# Patient Record
Sex: FEMALE | Race: WHITE | Employment: FULL TIME | URBAN - METROPOLITAN AREA
[De-identification: names, ages, dates, MRNs, and addresses within clinical notes are randomized per-mention and may not be internally consistent; named-entity substitution may affect disease eponyms.]

---

## 2022-04-05 ENCOUNTER — OFFICE VISIT (OUTPATIENT)
Dept: OBGYN CLINIC | Age: 47
End: 2022-04-05
Payer: COMMERCIAL

## 2022-04-05 VITALS — WEIGHT: 293 LBS

## 2022-04-05 DIAGNOSIS — Z11.51 SCREENING FOR HPV (HUMAN PAPILLOMAVIRUS): ICD-10-CM

## 2022-04-05 DIAGNOSIS — Z01.419 WOMEN'S ANNUAL ROUTINE GYNECOLOGICAL EXAMINATION: ICD-10-CM

## 2022-04-05 DIAGNOSIS — Z12.31 ENCOUNTER FOR SCREENING MAMMOGRAM FOR BREAST CANCER: Primary | ICD-10-CM

## 2022-04-05 PROCEDURE — 99386 PREV VISIT NEW AGE 40-64: CPT | Performed by: OBSTETRICS & GYNECOLOGY

## 2022-04-05 RX ORDER — ZINC GLUCONATE 50 MG
50 TABLET ORAL DAILY
COMMUNITY

## 2022-04-05 RX ORDER — B-COMPLEX WITH VITAMIN C
1 TABLET ORAL 2 TIMES DAILY WITH MEALS
COMMUNITY
End: 2022-10-24

## 2022-04-05 RX ORDER — ATORVASTATIN CALCIUM 10 MG/1
10 TABLET, FILM COATED ORAL DAILY
COMMUNITY

## 2022-04-05 RX ORDER — DOXYCYCLINE HYCLATE 100 MG
TABLET ORAL
COMMUNITY
Start: 2022-03-31 | End: 2022-04-28

## 2022-04-05 RX ORDER — PREDNISONE 10 MG/1
TABLET ORAL
COMMUNITY
Start: 2022-03-31 | End: 2022-08-25

## 2022-04-05 RX ORDER — LISINOPRIL 10 MG/1
10 TABLET ORAL DAILY
COMMUNITY

## 2022-04-05 RX ORDER — METHYLPREDNISOLONE 4 MG/1
TABLET ORAL
COMMUNITY
Start: 2022-01-13 | End: 2022-10-24 | Stop reason: ALTCHOICE

## 2022-04-05 RX ORDER — LANSOPRAZOLE 15 MG/1
15 CAPSULE, DELAYED RELEASE ORAL DAILY
COMMUNITY

## 2022-04-05 RX ORDER — GLIPIZIDE 10 MG/1
10 TABLET ORAL
COMMUNITY

## 2022-04-05 RX ORDER — CEFDINIR 300 MG/1
CAPSULE ORAL
COMMUNITY
Start: 2022-01-13 | End: 2022-10-24 | Stop reason: ALTCHOICE

## 2022-04-05 RX ORDER — MEDROXYPROGESTERONE ACETATE 10 MG/1
10 TABLET ORAL DAILY
Qty: 30 TABLET | Refills: 3 | Status: SHIPPED | OUTPATIENT
Start: 2022-04-05 | End: 2022-07-27 | Stop reason: SDUPTHER

## 2022-04-05 NOTE — PROGRESS NOTES
HPI:  Lesley Mckinley (: 1975) is a 42 y.o. female, Established patient, here for evaluation of the following chief complaint(s): Annual Exam (heavy and painful periods and irregular. See Dr. Rafael Mckeon who wont do LAVH. Bruderer drug for progesterone)  Patient is here for second opinion. She is a 53-year-old obese female with dysfunctional uterine bleeding. She states that her previous gynecologist is reluctant to do anything surgically and also would not give her any hormone treatment because she has had her tubes tied. And has irregular and heavy bleeding she had 2 previous  sections. She had a recent ultrasound which showed a normal intrauterine cavity and normal uterine      SUBJECTIVE/OBJECTIVE:    Past Surgical History:   Procedure Laterality Date     SECTION      DILATION AND CURETTAGE      ENDOMETRIAL BIOPSY          Review of Systems   Genitourinary: Positive for menstrual problem. Physical Exam  Vitals and nursing note reviewed. Exam conducted with a chaperone present. Constitutional:       Appearance: She is well-developed. HENT:      Head: Normocephalic and atraumatic. Eyes:      Pupils: Pupils are equal, round, and reactive to light. Neck:      Thyroid: No thyromegaly. Trachea: No tracheal deviation. Cardiovascular:      Rate and Rhythm: Normal rate and regular rhythm. Heart sounds: Normal heart sounds. Pulmonary:      Effort: Pulmonary effort is normal.      Breath sounds: Normal breath sounds. Chest:      Chest wall: No tenderness. Abdominal:      General: Bowel sounds are normal. There is no distension. Palpations: Abdomen is soft. There is no mass. Tenderness: There is no abdominal tenderness. There is no guarding or rebound. Hernia: There is no hernia in the left inguinal area. Genitourinary:     Labia:         Right: No rash, tenderness, lesion or injury. Left: No rash, tenderness, lesion or injury.        Vagina: Normal. No foreign body. No vaginal discharge, erythema, tenderness or bleeding. Cervix: No cervical motion tenderness or discharge. Uterus: Not deviated, not enlarged, not fixed and not tender. Adnexa:         Right: No mass, tenderness or fullness. Left: No mass, tenderness or fullness. Rectum: Normal. No mass, tenderness, anal fissure, external hemorrhoid or internal hemorrhoid. Normal anal tone. Musculoskeletal:         General: Normal range of motion. Cervical back: Normal range of motion. Lymphadenopathy:      Cervical: No cervical adenopathy. Neurological:      Mental Status: She is alert and oriented to person, place, and time. Vitals:    04/05/22 1455   Weight: (!) 336 lb (152.4 kg)         ASSESSMENT/PLAN:     Diagnosis Orders   1. Encounter for screening mammogram for breast cancer  JAZMINE DIGITAL SCREEN W OR WO CAD BILATERAL   2. Screening for HPV (human papillomavirus)  PAP SMEAR   3. Women's annual routine gynecological examination  PAP SMEAR    JAZMINE DIGITAL SCREEN W OR WO CAD BILATERAL   Functional uterine bleeding    PLAN: Recommend Provera 10 mg a day for 10 days then restart after the onset of her withdrawal bleed and continue that indefinitely. We discussed the use of an IUD to stop the bleeding versus a endometrial ablation patient will discuss that with her  and they will decide what they wanted. No follow-ups on file. On this date 4/5/2022 I have spent 40 minutes reviewing previous notes, test results and face to face with the patient discussing the diagnosis and importance of compliance with the treatment plan as well as documenting on the day of the visit. An electronic signature was used to authenticate this note.  Please note this report has been partially produced using speech recognition software and may cause contain errors related to that system including grammar, punctuation and spelling as well as words and phrases that may seem inappropriate. If there are questions or concerns please feel free to contact me to clarify.

## 2022-04-13 LAB
HPV COMMENT: NORMAL
HPV TYPE 16: NOT DETECTED
HPV TYPE 18: NOT DETECTED
HPVOH (OTHER TYPES): NOT DETECTED

## 2022-05-31 ENCOUNTER — TELEPHONE (OUTPATIENT)
Dept: OBGYN CLINIC | Age: 47
End: 2022-05-31

## 2022-05-31 NOTE — TELEPHONE ENCOUNTER
Lm for pt to call back regarding IUD form. Pt insurance does not have Erroll Mace as a provider listed for care so they can not submit claim for IUD. Pt will need to call insurance to see who is covered and we can resubmit for with other provider.

## 2022-06-02 NOTE — TELEPHONE ENCOUNTER
Patient states that she called her insurance and they told her that the Rx would need to be sent through her [facility] plan and the preferred through her insurance is peragard but the patient states that she needs the one with hormone and that would be the Mirena and that might need a PA done. So she is requesting that be done for the Mirena .

## 2022-06-22 ENCOUNTER — TELEPHONE (OUTPATIENT)
Dept: OBGYN CLINIC | Age: 47
End: 2022-06-22

## 2022-06-22 NOTE — TELEPHONE ENCOUNTER
Pt calling in for follow up on IUD. Please call the pt to update her and let her what pharmacy it was sent to .

## 2022-06-27 ENCOUNTER — TELEPHONE (OUTPATIENT)
Dept: OBGYN CLINIC | Age: 47
End: 2022-06-27

## 2022-07-27 RX ORDER — MEDROXYPROGESTERONE ACETATE 10 MG/1
10 TABLET ORAL DAILY
Qty: 30 TABLET | Refills: 3 | Status: SHIPPED | OUTPATIENT
Start: 2022-07-27 | End: 2022-08-25 | Stop reason: ALTCHOICE

## 2022-08-25 ENCOUNTER — PROCEDURE VISIT (OUTPATIENT)
Dept: OBGYN CLINIC | Age: 47
End: 2022-08-25
Payer: COMMERCIAL

## 2022-08-25 VITALS
DIASTOLIC BLOOD PRESSURE: 84 MMHG | HEART RATE: 76 BPM | HEIGHT: 65 IN | WEIGHT: 293 LBS | SYSTOLIC BLOOD PRESSURE: 136 MMHG | BODY MASS INDEX: 48.82 KG/M2

## 2022-08-25 DIAGNOSIS — Z30.430 ENCOUNTER FOR INSERTION OF MIRENA IUD: ICD-10-CM

## 2022-08-25 DIAGNOSIS — Z32.02 URINE PREGNANCY TEST NEGATIVE: ICD-10-CM

## 2022-08-25 DIAGNOSIS — N93.8 DUB (DYSFUNCTIONAL UTERINE BLEEDING): Primary | ICD-10-CM

## 2022-08-25 PROCEDURE — 58300 INSERT INTRAUTERINE DEVICE: CPT | Performed by: ADVANCED PRACTICE MIDWIFE

## 2022-08-25 PROCEDURE — 81025 URINE PREGNANCY TEST: CPT | Performed by: ADVANCED PRACTICE MIDWIFE

## 2022-08-25 NOTE — PROGRESS NOTES
IUD Insertion Procedure Note    Pre-operative Diagnosis:  Dysfunctional Uterine Bleeding    Post-operative Diagnosis:  Same    Indications:  Same    Procedure Details:   Urine pregnancy test was done and result was negative. The risks (including infection, bleeding, pain, and uterine perforation) and benefits of the procedure were explained to the patient and Written informed consent was obtained. A Time Out was performed with patient participation prior to beginning the procedure. Cervix cleansed with Betadine. Uterus sounded to 8 cm. Mirena IUD inserted without difficulty. Strings visible and trimmed. Patient tolerated procedure well. IUD Information:    Type:  Mirena  LOT Number:  [id number]  Expiration Date:  10/31/2024  NDC:  55709-172-08    Condition:  Stable    Complications:  None    Plan:  The patient was advised to call for any fever or for prolonged or severe pain or bleeding. She was advised to use OTC Ibuprofen or Tylenol as needed for mild to moderate pain. Follow up:  Return in about 4 weeks (around 9/22/2022) for Follow-up on new medication, (virtual is fine).     Particia GUILLERMO Lou CNM

## 2022-09-22 ENCOUNTER — TELEPHONE (OUTPATIENT)
Dept: OBGYN CLINIC | Age: 47
End: 2022-09-22

## 2022-09-22 ENCOUNTER — TELEMEDICINE (OUTPATIENT)
Dept: OBGYN CLINIC | Age: 47
End: 2022-09-22
Payer: COMMERCIAL

## 2022-09-22 DIAGNOSIS — Z30.431 INTRAUTERINE DEVICE SURVEILLANCE: ICD-10-CM

## 2022-09-22 DIAGNOSIS — Z97.5 BREAKTHROUGH BLEEDING WITH IUD: ICD-10-CM

## 2022-09-22 DIAGNOSIS — N92.1 BREAKTHROUGH BLEEDING WITH IUD: Primary | ICD-10-CM

## 2022-09-22 DIAGNOSIS — N94.6 DYSMENORRHEA: Primary | ICD-10-CM

## 2022-09-22 DIAGNOSIS — Z97.5 BREAKTHROUGH BLEEDING WITH IUD: Primary | ICD-10-CM

## 2022-09-22 DIAGNOSIS — N92.1 BREAKTHROUGH BLEEDING WITH IUD: ICD-10-CM

## 2022-09-22 PROCEDURE — 99214 OFFICE O/P EST MOD 30 MIN: CPT | Performed by: ADVANCED PRACTICE MIDWIFE

## 2022-09-22 RX ORDER — NORETHINDRONE AND ETHINYL ESTRADIOL 1 MG-35MCG
KIT ORAL
Qty: 3 PACKET | Refills: 3 | Status: SHIPPED
Start: 2022-09-22 | End: 2022-10-24 | Stop reason: ALTCHOICE

## 2022-09-22 ASSESSMENT — PATIENT HEALTH QUESTIONNAIRE - PHQ9
1. LITTLE INTEREST OR PLEASURE IN DOING THINGS: 0
SUM OF ALL RESPONSES TO PHQ9 QUESTIONS 1 & 2: 0
SUM OF ALL RESPONSES TO PHQ QUESTIONS 1-9: 0
SUM OF ALL RESPONSES TO PHQ QUESTIONS 1-9: 0
2. FEELING DOWN, DEPRESSED OR HOPELESS: 0
SUM OF ALL RESPONSES TO PHQ QUESTIONS 1-9: 0
SUM OF ALL RESPONSES TO PHQ QUESTIONS 1-9: 0

## 2022-09-22 ASSESSMENT — ENCOUNTER SYMPTOMS
SHORTNESS OF BREATH: 0
COUGH: 0
ABDOMINAL PAIN: 0
NAUSEA: 0
DIARRHEA: 0
VOMITING: 0
CONSTIPATION: 0

## 2022-09-22 NOTE — PROGRESS NOTES
Charmayne Shall (:  1975) is a 42 y.o. female Established patient, here for evaluation of the following chief complaint(s):   Chief Complaint   Patient presents with    Follow-up     IUD follow up. She states she's still cramping and bleeding with clots. SUBJECTIVE/OBJECTIVE:  Dysmenorrhea   Utilizing hormonal contraception to relieve uncomfortable menstrual symptoms. Mirena IUD placed 22. About 1 week following insertion she began having moderate bleeding with clots that has continued through today. The bleeding has not shown any signs of decreasing or resolving. Overall she is happy with Mirena and wishes to continue, but only if the bleeding resolves. Discussed treatment options:  allow time, scheduled Ibuprofen, short-term OCP's. She just had her tonsils removed and has not been cleared yet for NSAID use. Her follow-up appointment is today. If she is not cleared for NSAID use, she will opt for short-term OCP's. Review of Systems   Respiratory:  Negative for cough and shortness of breath. Gastrointestinal:  Negative for abdominal pain, constipation, diarrhea, nausea and vomiting. Genitourinary:  Negative for difficulty urinating, dysuria, menstrual problem, pelvic pain, vaginal bleeding and vaginal discharge. All other systems reviewed and are negative. No flowsheet data found. Physical Exam  Constitutional:       General: She is not in acute distress. Appearance: Normal appearance. She is not ill-appearing. Pulmonary:      Effort: Pulmonary effort is normal.   Neurological:      Mental Status: She is alert and oriented to person, place, and time. Psychiatric:         Mood and Affect: Mood normal.         Behavior: Behavior normal.         Thought Content: Thought content normal.         Judgment: Judgment normal.     ASSESSMENT/PLAN:   Diagnosis Orders   1. Dysmenorrhea        2. Intrauterine device surveillance        3.  Breakthrough bleeding with IUD No orders of the defined types were placed in this encounter. No orders of the defined types were placed in this encounter. Dysmenorrhea, IUD Surveillance  Mirena IUD placed 8/25/22 to relieve menstrual symptoms. Breakthrough Bleeding with IUD  Scheduled Ibuprofen 800mg TID x7 days if cleared for NSAID use by ENT. Short course OCP's if not cleared for NSAID use. Follow-up appointment with ENT is today, she will call to let me know which option to move forward with. Return if symptoms worsen or fail to improve. Bala Ignacio, was evaluated through a synchronous (real-time) audio-video encounter. The patient (or guardian if applicable) is aware that this is a billable service, which includes applicable co-pays. This Virtual Visit was conducted with patient's (and/or legal guardian's) consent. The visit was conducted pursuant to the emergency declaration under the [address] [place] [address], [address] [place] [address] authority and the [employer] Act. Patient identification was verified, and a caregiver was present when appropriate. The patient was located at home in a state where the provider was licensed to provide care. Total time spent for this encounter: Not billed by time    --GUILLERMO Dempsey CNM on 9/22/2022 at 8:43 AM    An electronic signature was used to authenticate this note.

## 2022-09-22 NOTE — TELEPHONE ENCOUNTER
Pt calling in, She spoke with her other provider , she is unable to use Motrin.      She will need to use [facility] as discussed

## 2022-10-13 ENCOUNTER — TELEMEDICINE (OUTPATIENT)
Dept: OBGYN CLINIC | Age: 47
End: 2022-10-13
Payer: COMMERCIAL

## 2022-10-13 DIAGNOSIS — Z97.5 BREAKTHROUGH BLEEDING WITH IUD: Primary | ICD-10-CM

## 2022-10-13 DIAGNOSIS — N92.1 BREAKTHROUGH BLEEDING WITH IUD: Primary | ICD-10-CM

## 2022-10-13 DIAGNOSIS — R10.2 PELVIC PAIN: ICD-10-CM

## 2022-10-13 PROCEDURE — 99213 OFFICE O/P EST LOW 20 MIN: CPT | Performed by: ADVANCED PRACTICE MIDWIFE

## 2022-10-13 ASSESSMENT — ENCOUNTER SYMPTOMS
CONSTIPATION: 0
SHORTNESS OF BREATH: 0
DIARRHEA: 0
ABDOMINAL PAIN: 0
NAUSEA: 0
COUGH: 0
VOMITING: 0

## 2022-10-13 NOTE — PROGRESS NOTES
Clare Jorgensen (:  1975) is a 42 y.o. female Established patient, here for evaluation of the following chief complaint(s):   Chief Complaint   Patient presents with    Pelvic Pain    Medication Problem     Continued bleeding with IUD        SUBJECTIVE/OBJECTIVE:  Dysmenorrhea, Breakthrough Bleeding with IUD   Utilizing hormonal contraception to relieve uncomfortable menstrual symptoms. Mirena IUD placed 22. She was initially experiencing heavy to moderate bleeding with clots that continued for greater than 4 weeks following insertion. She was unable to take Ibuprofen due to recent tonsillectomy surgery and instead started a course of OCP's to control/stop bleeding. Bleeding has improved, no primarily brown discharge that is occasionally bright red. She has finished packet #1 and just started packet #2. Left Pelvic Pain  New onset of sharp left lower pelvic pain that is severe when it occurs. Symptoms began within the past few days. Review of Systems   Respiratory:  Negative for cough and shortness of breath. Gastrointestinal:  Negative for abdominal pain, constipation, diarrhea, nausea and vomiting. Genitourinary:  Positive for menstrual problem. Negative for difficulty urinating, dysuria, pelvic pain, vaginal bleeding and vaginal discharge. All other systems reviewed and are negative. No flowsheet data found. Physical Exam  Constitutional:       General: She is not in acute distress. Appearance: Normal appearance. She is not ill-appearing. Pulmonary:      Effort: Pulmonary effort is normal.   Neurological:      Mental Status: She is alert and oriented to person, place, and time. Psychiatric:         Mood and Affect: Mood normal.         Behavior: Behavior normal.         Thought Content: Thought content normal.         Judgment: Judgment normal.     ASSESSMENT/PLAN:   Diagnosis Orders   1.  Breakthrough bleeding with IUD  [facility] OB TRANSVAGINAL 2. Pelvic pain  US PELVIS COMPLETE    US NON OB TRANSVAGINAL          Orders Placed This Encounter   Procedures    US PELVIS COMPLETE     This procedure can be scheduled via [employer]. Access your [employer] account by visiting [email or web address]. Standing Status:   Future     Standing Expiration Date:   10/13/2023     Order Specific Question:   Reason for exam:     Answer:   new onset of left pelvic pain    US NON OB TRANSVAGINAL     This procedure can be scheduled via [employer]. Access your [employer] account by visiting [email or web address]. Standing Status:   Future     Standing Expiration Date:   10/13/2023     No orders of the defined types were placed in this encounter. Breakthrough Bleeding with IUD  Discontinue OCP's x4-5 days, then restart. Pelvic Pain  Obtain pelvic US    Return for Discuss pelvic US results. Tony Wahl, was evaluated through a synchronous (real-time) audio-video encounter. The patient (or guardian if applicable) is aware that this is a billable service, which includes applicable co-pays. This Virtual Visit was conducted with patient's (and/or legal guardian's) consent. The visit was conducted pursuant to the emergency declaration under the [address] [place] [address] authority and the [employer] and [employer] General Act. Patient identification was verified, and a caregiver was present when appropriate. The patient was located at home in a state where the provider was licensed to provide care. Total time spent for this encounter: Not billed by time    --GUILLERMO Wagner CNM on 10/13/2022 at 10:46 AM    An electronic signature was used to authenticate this note.

## 2022-10-18 ENCOUNTER — HOSPITAL ENCOUNTER (OUTPATIENT)
Dept: ULTRASOUND IMAGING | Age: 47
Discharge: HOME | End: 2022-10-20
Payer: COMMERCIAL

## 2022-10-18 DIAGNOSIS — R10.2 PELVIC PAIN: ICD-10-CM

## 2022-10-18 DIAGNOSIS — N92.1 BREAKTHROUGH BLEEDING WITH IUD: ICD-10-CM

## 2022-10-18 DIAGNOSIS — Z97.5 BREAKTHROUGH BLEEDING WITH IUD: ICD-10-CM

## 2022-10-18 PROCEDURE — 76856 US EXAM PELVIC COMPLETE: CPT

## 2022-10-18 PROCEDURE — 76830 TRANSVAGINAL US NON-OB: CPT

## 2022-10-20 ENCOUNTER — TELEPHONE (OUTPATIENT)
Dept: OBGYN CLINIC | Age: 47
End: 2022-10-20

## 2022-10-24 ENCOUNTER — PROCEDURE VISIT (OUTPATIENT)
Dept: OBGYN CLINIC | Age: 47
End: 2022-10-24
Payer: COMMERCIAL

## 2022-10-24 VITALS
WEIGHT: 293 LBS | BODY MASS INDEX: 53.08 KG/M2 | SYSTOLIC BLOOD PRESSURE: 168 MMHG | HEART RATE: 90 BPM | DIASTOLIC BLOOD PRESSURE: 92 MMHG

## 2022-10-24 DIAGNOSIS — Z97.5 BREAKTHROUGH BLEEDING WITH IUD: ICD-10-CM

## 2022-10-24 DIAGNOSIS — N92.1 BREAKTHROUGH BLEEDING WITH IUD: ICD-10-CM

## 2022-10-24 DIAGNOSIS — Z30.432 ENCOUNTER FOR IUD REMOVAL: ICD-10-CM

## 2022-10-24 DIAGNOSIS — Z30.011 ORAL CONTRACEPTION INITIAL PRESCRIPTION: ICD-10-CM

## 2022-10-24 DIAGNOSIS — N94.6 DYSMENORRHEA: Primary | ICD-10-CM

## 2022-10-24 PROCEDURE — 58301 REMOVE INTRAUTERINE DEVICE: CPT | Performed by: ADVANCED PRACTICE MIDWIFE

## 2022-10-24 PROCEDURE — 99214 OFFICE O/P EST MOD 30 MIN: CPT | Performed by: ADVANCED PRACTICE MIDWIFE

## 2022-10-24 RX ORDER — LEVONORGESTREL AND ETHINYL ESTRADIOL 0.1-0.02MG
1 KIT ORAL DAILY
Qty: 90 TABLET | Refills: 3 | Status: SHIPPED | OUTPATIENT
Start: 2022-10-24 | End: 2023-10-24

## 2022-10-24 RX ORDER — LEVONORGESTREL AND ETHINYL ESTRADIOL 0.1-0.02MG
1 KIT ORAL DAILY
Qty: 1 PACKET | Refills: 3 | Status: CANCELLED | OUTPATIENT
Start: 2022-10-24

## 2022-10-24 ASSESSMENT — ENCOUNTER SYMPTOMS
VOMITING: 0
SHORTNESS OF BREATH: 0
ABDOMINAL PAIN: 0
CONSTIPATION: 0
COUGH: 0
NAUSEA: 0
DIARRHEA: 0

## 2022-10-24 NOTE — PROGRESS NOTES
SUBJECTIVE:  Mayco Martinez is a 42 y.o. female who presents here today for complaints of:      Chief Complaint   Patient presents with    Procedure     IUD removal d/t bleeding for 9 weeks     Breakthrough Bleeding with IUD  Continues to have moderate to heavy breakthrough bleeding with her IUD that has not decreased or shown any signs of resolving. Breakthrough bleeding did not improve with a cycle of Ibuprofen or with the addition of combined OCP's. She is requesting removal and to transition back to combined OCP's. Discussed the option of consultation for hysterectomy as a better long-term solution to the dysmenorrhea she has been struggling with for years. Review of Systems   Respiratory:  Negative for cough and shortness of breath. Gastrointestinal:  Negative for abdominal pain, constipation, diarrhea, nausea and vomiting. Genitourinary:  Positive for menstrual problem. Negative for difficulty urinating, dysuria, pelvic pain, vaginal bleeding and vaginal discharge. All other systems reviewed and are negative. OBJECTIVE:  Vitals:  BP (!) 168/92   Pulse 90   Wt (!) 319 lb (144.7 kg)   BMI 53.08 kg/m²     Physical Exam  Vitals and nursing note reviewed. Constitutional:       General: She is not in acute distress. Appearance: Normal appearance. She is not ill-appearing, toxic-appearing or diaphoretic. HENT:      Head: Normocephalic. Nose: No congestion or rhinorrhea. Mouth/Throat:      Mouth: Mucous membranes are moist.   Eyes:      General: No scleral icterus. Right eye: No discharge. Left eye: No discharge. Cardiovascular:      Rate and Rhythm: Normal rate and regular rhythm. Pulses: Normal pulses. Pulmonary:      Effort: Pulmonary effort is normal. No respiratory distress. Abdominal:      Palpations: Abdomen is soft. Hernia: There is no hernia in the left inguinal area or right inguinal area. Genitourinary:     General: Normal vulva.       Exam position: Lithotomy position. Pubic Area: No rash or pubic lice. Labia:         Right: No rash, tenderness, lesion or injury. Left: No rash, tenderness, lesion or injury. Urethra: No prolapse, urethral pain, urethral swelling or urethral lesion. Vagina: No signs of injury and foreign body. No vaginal discharge, erythema, tenderness, bleeding, lesions or prolapsed vaginal walls. Cervix: No cervical motion tenderness, discharge, friability, lesion, erythema, cervical bleeding or eversion. Uterus: Not deviated, not enlarged, not fixed, not tender and no uterine prolapse. Adnexa:         Right: No mass, tenderness or fullness. Left: No mass, tenderness or fullness. Rectum: No mass or external hemorrhoid. Musculoskeletal:         General: Normal range of motion. Cervical back: Normal range of motion and neck supple. Right lower leg: No edema. Left lower leg: No edema. Lymphadenopathy:      Lower Body: No right inguinal adenopathy. No left inguinal adenopathy. Skin:     General: Skin is warm and dry. Capillary Refill: Capillary refill takes less than 2 seconds. Coloration: Skin is not jaundiced or pale. Neurological:      Mental Status: She is alert and oriented to person, place, and time. Mental status is at baseline. Motor: No weakness. Coordination: Coordination normal.      Gait: Gait normal.   Psychiatric:         Mood and Affect: Mood normal.         Behavior: Behavior normal.     ASSESSMENT & PLAN:   Diagnosis Orders   1. Dysmenorrhea        2. Breakthrough bleeding with IUD        3. Encounter for IUD removal        4. Oral contraception initial prescription            Dysmenorrhea  Utilizing hormonal contraception to relieve uncomfortable menstrual symptoms.     Breakthrough Bleeding with IUD, Requesting Removal  See procedure note    Combined OCP's, Initial Prescription  Quick start Aviane (0.1-20)  Hysterectomy consultation with Dr. Basim Escobar    Return for Hysterectomy consultation with Dr. Basim Escobar.     Michelle Chiu, GUILLERMO - FAITH

## 2022-10-24 NOTE — PROGRESS NOTES
IUD Removal Procedure Note    Pre-operative Diagnosis:  Breakthrough Bleeding with IUD    Post-operative Diagnosis:  Same    Indications:  Same    Procedure Details: The risks (including infection, bleeding, pain) and benefits of the procedure were explained to the patient and Written informed consent was obtained. A Time Out was performed with patient participation prior to beginning the procedure. The cervix was visualized, IUD strings present. The existing IUD strings were grasped with ring forceps, the IUD was removed intact, and shown to the patient for confirmation that it had been removed and was intact. The procedure was tolerated well. Condition:  Stable    Complications:  None    Plan:  The patient was advised to call for any fever or for prolonged or severe pain or bleeding. She was advised to use OTC Ibuprofen or Tylenol as needed for mild to moderate pain. Follow up:  Return for Hysterectomy consultation with Dr. Jorge Mccracken.     GUILLERMO Panchal CNM

## 2022-10-26 ENCOUNTER — TELEPHONE (OUTPATIENT)
Dept: OBGYN CLINIC | Age: 47
End: 2022-10-26

## 2022-10-26 ENCOUNTER — PATIENT MESSAGE (OUTPATIENT)
Dept: OBGYN CLINIC | Age: 47
End: 2022-10-26

## 2022-10-26 DIAGNOSIS — N92.0 MENORRHAGIA DUE TO INTRAUTERINE DEVICE (IUD) (HCC): Primary | ICD-10-CM

## 2022-10-26 DIAGNOSIS — T83.89XA MENORRHAGIA DUE TO INTRAUTERINE DEVICE (IUD) (HCC): Primary | ICD-10-CM

## 2022-10-26 DIAGNOSIS — N94.6 DYSMENORRHEA: Primary | ICD-10-CM

## 2022-10-26 DIAGNOSIS — R11.2 DRUG-INDUCED NAUSEA AND VOMITING: ICD-10-CM

## 2022-10-26 DIAGNOSIS — T50.905A DRUG-INDUCED NAUSEA AND VOMITING: ICD-10-CM

## 2022-10-26 RX ORDER — IBUPROFEN 800 MG/1
800 TABLET ORAL EVERY 8 HOURS SCHEDULED
Qty: 15 TABLET | Refills: 1 | Status: SHIPPED | OUTPATIENT
Start: 2022-10-26 | End: 2022-11-04

## 2022-10-26 NOTE — TELEPHONE ENCOUNTER
Continues to have heavy bleeding following IUD removal.  Start scheduled Ibuprofen, follow-up if bleeding does not improve.

## 2022-10-28 RX ORDER — ONDANSETRON 4 MG/1
TABLET, ORALLY DISINTEGRATING ORAL
Qty: 15 TABLET | Refills: 0 | Status: SHIPPED | OUTPATIENT
Start: 2022-10-28 | End: 2022-11-27

## 2022-10-28 RX ORDER — ESTRADIOL 2 MG/1
TABLET ORAL
Qty: 16 TABLET | Refills: 0 | Status: SHIPPED | OUTPATIENT
Start: 2022-10-28 | End: 2022-11-17 | Stop reason: ALTCHOICE

## 2022-10-28 NOTE — TELEPHONE ENCOUNTER
From: Seng Nassar  To: Bob Osei APRN - CNM  Sent: 10/26/2022 6:12 AM EDT  Subject: Bleeding    I was just in on Monday for my IUD removal for bleeding for almost 9 weeks. Last week before removal I had a regular period. After removal on Monday, I have been bleeding moderate to heavy, bright red. However, Im having some clotting and when I wipe its thick and chunky like. Is that normal? If so how long would that be considered normal to happen? Thank you!

## 2022-11-04 DIAGNOSIS — N94.6 DYSMENORRHEA: ICD-10-CM

## 2022-11-04 RX ORDER — IBUPROFEN 800 MG/1
TABLET ORAL
Qty: 15 TABLET | Refills: 1 | Status: SHIPPED | OUTPATIENT
Start: 2022-11-04

## 2022-11-17 ENCOUNTER — OFFICE VISIT (OUTPATIENT)
Dept: OBGYN CLINIC | Age: 47
End: 2022-11-17
Payer: COMMERCIAL

## 2022-11-17 VITALS
WEIGHT: 293 LBS | SYSTOLIC BLOOD PRESSURE: 124 MMHG | HEART RATE: 93 BPM | BODY MASS INDEX: 53.25 KG/M2 | DIASTOLIC BLOOD PRESSURE: 88 MMHG

## 2022-11-17 DIAGNOSIS — N92.0 MENORRHAGIA WITH REGULAR CYCLE: Primary | ICD-10-CM

## 2022-11-17 PROBLEM — H93.19 TINNITUS: Status: ACTIVE | Noted: 2022-03-31

## 2022-11-17 PROBLEM — Z90.89 S/P TONSILLECTOMY: Status: ACTIVE | Noted: 2022-09-22

## 2022-11-17 PROBLEM — H69.80 EUSTACHIAN TUBE DYSFUNCTION: Status: ACTIVE | Noted: 2022-03-31

## 2022-11-17 PROCEDURE — 99213 OFFICE O/P EST LOW 20 MIN: CPT | Performed by: OBSTETRICS & GYNECOLOGY

## 2022-11-17 ASSESSMENT — ENCOUNTER SYMPTOMS
CHEST TIGHTNESS: 0
NAUSEA: 0
WHEEZING: 0
BLOOD IN STOOL: 0
ABDOMINAL PAIN: 0
TROUBLE SWALLOWING: 0
VOMITING: 0
CONSTIPATION: 0
SORE THROAT: 0
COLOR CHANGE: 0
COUGH: 0
BACK PAIN: 0
VOICE CHANGE: 0
ABDOMINAL DISTENTION: 0
SHORTNESS OF BREATH: 0

## 2022-11-17 NOTE — PROGRESS NOTES
HPI:  Cesia Bey (: 1975) is a 42 y.o. female, Established patient, here for evaluation of the following chief complaint(s):  Surgical Consult (Hysterectomy. Was told by another surgeon that she was too heavy for surgery. Has had IUD and birth control. Heavy bleeding and clots. US done.)  Patient is here to discuss surgical options for menorrhagia. She has failed hormonal methods of treatment as well as an IUD. Needs 5 to 7 days and changes a pad every hour. ultraSound showed a normal size uterus. She has had 2  sections    SUBJECTIVE/OBJECTIVE:    Past Surgical History:   Procedure Laterality Date     SECTION      DILATION AND CURETTAGE      ENDOMETRIAL BIOPSY          Review of Systems   Constitutional:  Negative for activity change, appetite change, fatigue and unexpected weight change. HENT:  Negative for dental problem, ear pain, hearing loss, nosebleeds, sore throat, trouble swallowing and voice change. Eyes:  Negative for visual disturbance. Respiratory:  Negative for cough, chest tightness, shortness of breath and wheezing. Cardiovascular:  Negative for chest pain and palpitations. Gastrointestinal:  Negative for abdominal distention, abdominal pain, blood in stool, constipation, nausea and vomiting. Endocrine: Negative for cold intolerance, heat intolerance, polydipsia, polyphagia and polyuria. Genitourinary:  Positive for menstrual problem. Negative for difficulty urinating, dyspareunia, dysuria, flank pain, frequency, genital sores, hematuria, pelvic pain, urgency, vaginal bleeding, vaginal discharge and vaginal pain. Musculoskeletal:  Negative for arthralgias, back pain, joint swelling and myalgias. Skin:  Negative for color change and rash. Allergic/Immunologic: Negative for environmental allergies, food allergies and immunocompromised state.    Neurological:  Negative for dizziness, seizures, syncope, speech difficulty, weakness, numbness and headaches. Hematological:  Negative for adenopathy. Does not bruise/bleed easily. Psychiatric/Behavioral:  Negative for agitation, behavioral problems, confusion, decreased concentration, dysphoric mood and suicidal ideas. The patient is not nervous/anxious and is not hyperactive. Physical Exam  Vitals and nursing note reviewed. Constitutional:       Appearance: Normal appearance. Neurological:      Mental Status: She is alert. Vitals:    22 1440   BP: 124/88   Pulse: 93   Weight: (!) 320 lb (145.2 kg)         ASSESSMENT/PLAN:    menorrhagia  Obesity  Previous  section x2    PLAN: Discussed endometrial ablation versus a LAVH. Benefits alternatives of both procedures were explained to the patient in detail. Continue think about what she wants to do and let me know but she is leaning toward the ablation. She has had a tubal ligation in the past      No follow-ups on file. On this date 2022 I have spent 20 minutes reviewing previous notes, test results and face to face with the patient discussing the diagnosis and importance of compliance with the treatment plan as well as documenting on the day of the visit. An electronic signature was used to authenticate this note. Please note this report has been partially produced using speech recognition software and may cause contain errors related to that system including grammar, punctuation and spelling as well as words and phrases that may seem inappropriate. If there are questions or concerns please feel free to contact me to clarify.

## 2022-11-23 ENCOUNTER — TELEPHONE (OUTPATIENT)
Dept: OBGYN CLINIC | Age: 47
End: 2022-11-23

## 2022-11-23 NOTE — TELEPHONE ENCOUNTER
Spoke to the pt, she is still undecided about surgery. She would like for me to give her a call back at the end of December.

## 2023-01-11 DIAGNOSIS — Z12.31 ENCOUNTER FOR SCREENING MAMMOGRAM FOR BREAST CANCER: ICD-10-CM

## 2023-01-11 DIAGNOSIS — Z01.419 WOMEN'S ANNUAL ROUTINE GYNECOLOGICAL EXAMINATION: ICD-10-CM

## 2023-01-16 ENCOUNTER — TELEPHONE (OUTPATIENT)
Dept: OBGYN CLINIC | Age: 48
End: 2023-01-16

## 2023-03-13 ENCOUNTER — TELEPHONE (OUTPATIENT)
Dept: OBGYN CLINIC | Age: 48
End: 2023-03-13

## 2023-03-13 NOTE — TELEPHONE ENCOUNTER
Pt calling in to schedule surgery. Has decided to move forward with hysterectomy.  Please let surgery scheduler know what is being scheduled for pt, complete form

## 2023-03-16 ENCOUNTER — PREP FOR PROCEDURE (OUTPATIENT)
Dept: OBGYN CLINIC | Age: 48
End: 2023-03-16

## 2023-03-16 PROBLEM — R10.2 PELVIC PAIN IN FEMALE: Status: ACTIVE | Noted: 2023-03-16

## 2023-03-16 PROBLEM — N93.9 ABNORMAL UTERINE BLEEDING: Status: ACTIVE | Noted: 2023-03-16

## 2023-03-16 PROBLEM — N92.0 MENORRHAGIA: Status: ACTIVE | Noted: 2023-03-16

## 2023-03-17 RX ORDER — SODIUM CHLORIDE 9 MG/ML
INJECTION, SOLUTION INTRAVENOUS PRN
Status: CANCELLED | OUTPATIENT
Start: 2023-03-17

## 2023-03-17 RX ORDER — SODIUM CHLORIDE 0.9 % (FLUSH) 0.9 %
5-40 SYRINGE (ML) INJECTION EVERY 12 HOURS SCHEDULED
Status: CANCELLED | OUTPATIENT
Start: 2023-03-17

## 2023-03-17 RX ORDER — SODIUM CHLORIDE 0.9 % (FLUSH) 0.9 %
5-40 SYRINGE (ML) INJECTION PRN
Status: CANCELLED | OUTPATIENT
Start: 2023-03-17

## 2023-04-14 ENCOUNTER — ANESTHESIA EVENT (OUTPATIENT)
Dept: OPERATING ROOM | Age: 48
End: 2023-04-14
Payer: COMMERCIAL

## 2023-04-17 ENCOUNTER — ANESTHESIA (OUTPATIENT)
Dept: OPERATING ROOM | Age: 48
End: 2023-04-17
Payer: COMMERCIAL

## 2023-04-17 ENCOUNTER — HOSPITAL ENCOUNTER (OUTPATIENT)
Age: 48
Setting detail: OUTPATIENT SURGERY
Discharge: HOME | End: 2023-04-17
Attending: OBSTETRICS & GYNECOLOGY | Admitting: OBSTETRICS & GYNECOLOGY
Payer: COMMERCIAL

## 2023-04-17 VITALS
TEMPERATURE: 96.9 F | BODY MASS INDEX: 47.09 KG/M2 | HEART RATE: 85 BPM | WEIGHT: 293 LBS | RESPIRATION RATE: 16 BRPM | HEIGHT: 66 IN | DIASTOLIC BLOOD PRESSURE: 81 MMHG | OXYGEN SATURATION: 99 % | SYSTOLIC BLOOD PRESSURE: 169 MMHG

## 2023-04-17 DIAGNOSIS — R10.2 PELVIC PAIN IN FEMALE: ICD-10-CM

## 2023-04-17 DIAGNOSIS — N93.9 ABNORMAL UTERINE BLEEDING: ICD-10-CM

## 2023-04-17 DIAGNOSIS — G89.18 POSTOPERATIVE PAIN: Primary | ICD-10-CM

## 2023-04-17 DIAGNOSIS — N92.0 MENORRHAGIA: ICD-10-CM

## 2023-04-17 LAB
CHP ED QC CHECK: NORMAL
GLUCOSE BLD-MCNC: 277 MG/DL
GLUCOSE BLD-MCNC: 277 MG/DL (ref 70–99)
GLUCOSE BLD-MCNC: 348 MG/DL (ref 70–99)
HCG, URINE, POC: NEGATIVE
Lab: NORMAL
NEGATIVE QC PASS/FAIL: NORMAL
PERFORMED ON: ABNORMAL
PERFORMED ON: ABNORMAL
POSITIVE QC PASS/FAIL: NORMAL

## 2023-04-17 PROCEDURE — 88342 IMHCHEM/IMCYTCHM 1ST ANTB: CPT

## 2023-04-17 PROCEDURE — 7100000011 HC PHASE II RECOVERY - ADDTL 15 MIN: Performed by: OBSTETRICS & GYNECOLOGY

## 2023-04-17 PROCEDURE — 6360000002 HC RX W HCPCS: Performed by: STUDENT IN AN ORGANIZED HEALTH CARE EDUCATION/TRAINING PROGRAM

## 2023-04-17 PROCEDURE — 2500000003 HC RX 250 WO HCPCS: Performed by: STUDENT IN AN ORGANIZED HEALTH CARE EDUCATION/TRAINING PROGRAM

## 2023-04-17 PROCEDURE — 2580000003 HC RX 258: Performed by: OBSTETRICS & GYNECOLOGY

## 2023-04-17 PROCEDURE — 7100000010 HC PHASE II RECOVERY - FIRST 15 MIN: Performed by: OBSTETRICS & GYNECOLOGY

## 2023-04-17 PROCEDURE — 3700000000 HC ANESTHESIA ATTENDED CARE: Performed by: OBSTETRICS & GYNECOLOGY

## 2023-04-17 PROCEDURE — 6370000000 HC RX 637 (ALT 250 FOR IP): Performed by: STUDENT IN AN ORGANIZED HEALTH CARE EDUCATION/TRAINING PROGRAM

## 2023-04-17 PROCEDURE — 2720000010 HC SURG SUPPLY STERILE: Performed by: OBSTETRICS & GYNECOLOGY

## 2023-04-17 PROCEDURE — 2709999900 HC NON-CHARGEABLE SUPPLY: Performed by: OBSTETRICS & GYNECOLOGY

## 2023-04-17 PROCEDURE — 7100000000 HC PACU RECOVERY - FIRST 15 MIN: Performed by: OBSTETRICS & GYNECOLOGY

## 2023-04-17 PROCEDURE — 64488 TAP BLOCK BI INJECTION: CPT | Performed by: STUDENT IN AN ORGANIZED HEALTH CARE EDUCATION/TRAINING PROGRAM

## 2023-04-17 PROCEDURE — 3600000004 HC SURGERY LEVEL 4 BASE: Performed by: OBSTETRICS & GYNECOLOGY

## 2023-04-17 PROCEDURE — 3700000001 HC ADD 15 MINUTES (ANESTHESIA): Performed by: OBSTETRICS & GYNECOLOGY

## 2023-04-17 PROCEDURE — 88307 TISSUE EXAM BY PATHOLOGIST: CPT

## 2023-04-17 PROCEDURE — A4217 STERILE WATER/SALINE, 500 ML: HCPCS | Performed by: OBSTETRICS & GYNECOLOGY

## 2023-04-17 PROCEDURE — 6360000002 HC RX W HCPCS: Performed by: OBSTETRICS & GYNECOLOGY

## 2023-04-17 PROCEDURE — 3600000014 HC SURGERY LEVEL 4 ADDTL 15MIN: Performed by: OBSTETRICS & GYNECOLOGY

## 2023-04-17 PROCEDURE — 7100000001 HC PACU RECOVERY - ADDTL 15 MIN: Performed by: OBSTETRICS & GYNECOLOGY

## 2023-04-17 PROCEDURE — 6370000000 HC RX 637 (ALT 250 FOR IP): Performed by: OBSTETRICS & GYNECOLOGY

## 2023-04-17 PROCEDURE — 2500000003 HC RX 250 WO HCPCS: Performed by: OBSTETRICS & GYNECOLOGY

## 2023-04-17 RX ORDER — FENTANYL CITRATE 0.05 MG/ML
50 INJECTION, SOLUTION INTRAMUSCULAR; INTRAVENOUS EVERY 10 MIN PRN
Status: COMPLETED | OUTPATIENT
Start: 2023-04-17 | End: 2023-04-17

## 2023-04-17 RX ORDER — ONDANSETRON 4 MG/1
4 TABLET, ORALLY DISINTEGRATING ORAL EVERY 8 HOURS PRN
Status: DISCONTINUED | OUTPATIENT
Start: 2023-04-17 | End: 2023-04-17 | Stop reason: HOSPADM

## 2023-04-17 RX ORDER — DEXAMETHASONE SODIUM PHOSPHATE 4 MG/ML
INJECTION, SOLUTION INTRA-ARTICULAR; INTRALESIONAL; INTRAMUSCULAR; INTRAVENOUS; SOFT TISSUE PRN
Status: DISCONTINUED | OUTPATIENT
Start: 2023-04-17 | End: 2023-04-17 | Stop reason: SDUPTHER

## 2023-04-17 RX ORDER — METRONIDAZOLE 500 MG/100ML
500 INJECTION, SOLUTION INTRAVENOUS ONCE
Status: COMPLETED | OUTPATIENT
Start: 2023-04-17 | End: 2023-04-17

## 2023-04-17 RX ORDER — SODIUM CHLORIDE 0.9 % (FLUSH) 0.9 %
5-40 SYRINGE (ML) INJECTION PRN
Status: DISCONTINUED | OUTPATIENT
Start: 2023-04-17 | End: 2023-04-17 | Stop reason: HOSPADM

## 2023-04-17 RX ORDER — ONDANSETRON 2 MG/ML
INJECTION INTRAMUSCULAR; INTRAVENOUS PRN
Status: DISCONTINUED | OUTPATIENT
Start: 2023-04-17 | End: 2023-04-17 | Stop reason: SDUPTHER

## 2023-04-17 RX ORDER — DIPHENHYDRAMINE HYDROCHLORIDE 50 MG/ML
12.5 INJECTION INTRAMUSCULAR; INTRAVENOUS
Status: DISCONTINUED | OUTPATIENT
Start: 2023-04-17 | End: 2023-04-17 | Stop reason: HOSPADM

## 2023-04-17 RX ORDER — ROCURONIUM BROMIDE 10 MG/ML
INJECTION, SOLUTION INTRAVENOUS PRN
Status: DISCONTINUED | OUTPATIENT
Start: 2023-04-17 | End: 2023-04-17 | Stop reason: SDUPTHER

## 2023-04-17 RX ORDER — MIDAZOLAM HYDROCHLORIDE 1 MG/ML
INJECTION INTRAMUSCULAR; INTRAVENOUS PRN
Status: DISCONTINUED | OUTPATIENT
Start: 2023-04-17 | End: 2023-04-17 | Stop reason: SDUPTHER

## 2023-04-17 RX ORDER — OXYCODONE HYDROCHLORIDE 5 MG/1
10 TABLET ORAL PRN
Status: COMPLETED | OUTPATIENT
Start: 2023-04-17 | End: 2023-04-17

## 2023-04-17 RX ORDER — ONDANSETRON 2 MG/ML
4 INJECTION INTRAMUSCULAR; INTRAVENOUS
Status: DISCONTINUED | OUTPATIENT
Start: 2023-04-17 | End: 2023-04-17 | Stop reason: HOSPADM

## 2023-04-17 RX ORDER — PROCHLORPERAZINE EDISYLATE 5 MG/ML
5 INJECTION INTRAMUSCULAR; INTRAVENOUS
Status: DISCONTINUED | OUTPATIENT
Start: 2023-04-17 | End: 2023-04-17 | Stop reason: HOSPADM

## 2023-04-17 RX ORDER — OXYCODONE HYDROCHLORIDE 5 MG/1
5 TABLET ORAL PRN
Status: COMPLETED | OUTPATIENT
Start: 2023-04-17 | End: 2023-04-17

## 2023-04-17 RX ORDER — MEPERIDINE HYDROCHLORIDE 25 MG/ML
12.5 INJECTION INTRAMUSCULAR; INTRAVENOUS; SUBCUTANEOUS EVERY 5 MIN PRN
Status: DISCONTINUED | OUTPATIENT
Start: 2023-04-17 | End: 2023-04-17 | Stop reason: HOSPADM

## 2023-04-17 RX ORDER — SODIUM CHLORIDE 9 MG/ML
INJECTION, SOLUTION INTRAVENOUS PRN
Status: DISCONTINUED | OUTPATIENT
Start: 2023-04-17 | End: 2023-04-17 | Stop reason: HOSPADM

## 2023-04-17 RX ORDER — IPRATROPIUM BROMIDE AND ALBUTEROL SULFATE 2.5; .5 MG/3ML; MG/3ML
1 SOLUTION RESPIRATORY (INHALATION)
Status: DISCONTINUED | OUTPATIENT
Start: 2023-04-17 | End: 2023-04-17 | Stop reason: HOSPADM

## 2023-04-17 RX ORDER — SODIUM CHLORIDE 0.9 % (FLUSH) 0.9 %
5-40 SYRINGE (ML) INJECTION EVERY 12 HOURS SCHEDULED
Status: DISCONTINUED | OUTPATIENT
Start: 2023-04-17 | End: 2023-04-17 | Stop reason: HOSPADM

## 2023-04-17 RX ORDER — ROPIVACAINE HYDROCHLORIDE 5 MG/ML
INJECTION, SOLUTION EPIDURAL; INFILTRATION; PERINEURAL
Status: COMPLETED | OUTPATIENT
Start: 2023-04-17 | End: 2023-04-17

## 2023-04-17 RX ORDER — HYDRALAZINE HYDROCHLORIDE 20 MG/ML
10 INJECTION INTRAMUSCULAR; INTRAVENOUS
Status: DISCONTINUED | OUTPATIENT
Start: 2023-04-17 | End: 2023-04-17 | Stop reason: HOSPADM

## 2023-04-17 RX ORDER — ACETAMINOPHEN 325 MG/1
650 TABLET ORAL
Status: DISCONTINUED | OUTPATIENT
Start: 2023-04-17 | End: 2023-04-17 | Stop reason: HOSPADM

## 2023-04-17 RX ORDER — SODIUM CHLORIDE, SODIUM LACTATE, POTASSIUM CHLORIDE, CALCIUM CHLORIDE 600; 310; 30; 20 MG/100ML; MG/100ML; MG/100ML; MG/100ML
INJECTION, SOLUTION INTRAVENOUS CONTINUOUS
Status: DISCONTINUED | OUTPATIENT
Start: 2023-04-17 | End: 2023-04-17 | Stop reason: HOSPADM

## 2023-04-17 RX ORDER — LIDOCAINE HYDROCHLORIDE 20 MG/ML
JELLY TOPICAL PRN
Status: DISCONTINUED | OUTPATIENT
Start: 2023-04-17 | End: 2023-04-17 | Stop reason: HOSPADM

## 2023-04-17 RX ORDER — DIPHENHYDRAMINE HYDROCHLORIDE 50 MG/ML
INJECTION INTRAMUSCULAR; INTRAVENOUS PRN
Status: DISCONTINUED | OUTPATIENT
Start: 2023-04-17 | End: 2023-04-17 | Stop reason: SDUPTHER

## 2023-04-17 RX ORDER — PROPOFOL 10 MG/ML
INJECTION, EMULSION INTRAVENOUS PRN
Status: DISCONTINUED | OUTPATIENT
Start: 2023-04-17 | End: 2023-04-17 | Stop reason: SDUPTHER

## 2023-04-17 RX ORDER — LABETALOL HYDROCHLORIDE 5 MG/ML
10 INJECTION, SOLUTION INTRAVENOUS
Status: DISCONTINUED | OUTPATIENT
Start: 2023-04-17 | End: 2023-04-17 | Stop reason: HOSPADM

## 2023-04-17 RX ORDER — OXYCODONE HYDROCHLORIDE AND ACETAMINOPHEN 5; 325 MG/1; MG/1
1 TABLET ORAL EVERY 6 HOURS PRN
Qty: 20 TABLET | Refills: 0 | Status: SHIPPED | OUTPATIENT
Start: 2023-04-17 | End: 2023-04-22

## 2023-04-17 RX ORDER — KETOROLAC TROMETHAMINE 30 MG/ML
INJECTION, SOLUTION INTRAMUSCULAR; INTRAVENOUS PRN
Status: DISCONTINUED | OUTPATIENT
Start: 2023-04-17 | End: 2023-04-17 | Stop reason: SDUPTHER

## 2023-04-17 RX ORDER — LIDOCAINE HYDROCHLORIDE 20 MG/ML
INJECTION, SOLUTION INTRAVENOUS PRN
Status: DISCONTINUED | OUTPATIENT
Start: 2023-04-17 | End: 2023-04-17 | Stop reason: SDUPTHER

## 2023-04-17 RX ORDER — MAGNESIUM HYDROXIDE 1200 MG/15ML
LIQUID ORAL CONTINUOUS PRN
Status: DISCONTINUED | OUTPATIENT
Start: 2023-04-17 | End: 2023-04-17 | Stop reason: HOSPADM

## 2023-04-17 RX ORDER — FENTANYL CITRATE 50 UG/ML
INJECTION, SOLUTION INTRAMUSCULAR; INTRAVENOUS PRN
Status: DISCONTINUED | OUTPATIENT
Start: 2023-04-17 | End: 2023-04-17 | Stop reason: SDUPTHER

## 2023-04-17 RX ORDER — SODIUM CHLORIDE, SODIUM LACTATE, POTASSIUM CHLORIDE, CALCIUM CHLORIDE 600; 310; 30; 20 MG/100ML; MG/100ML; MG/100ML; MG/100ML
INJECTION, SOLUTION INTRAVENOUS CONTINUOUS PRN
Status: DISCONTINUED | OUTPATIENT
Start: 2023-04-17 | End: 2023-04-17 | Stop reason: HOSPADM

## 2023-04-17 RX ORDER — ONDANSETRON 2 MG/ML
4 INJECTION INTRAMUSCULAR; INTRAVENOUS EVERY 6 HOURS PRN
Status: DISCONTINUED | OUTPATIENT
Start: 2023-04-17 | End: 2023-04-17 | Stop reason: HOSPADM

## 2023-04-17 RX ADMIN — ROCURONIUM BROMIDE 40 MG: 10 INJECTION, SOLUTION INTRAVENOUS at 08:00

## 2023-04-17 RX ADMIN — ROCURONIUM BROMIDE 60 MG: 10 INJECTION, SOLUTION INTRAVENOUS at 07:40

## 2023-04-17 RX ADMIN — FENTANYL CITRATE 100 MCG: 50 INJECTION, SOLUTION INTRAMUSCULAR; INTRAVENOUS at 07:37

## 2023-04-17 RX ADMIN — FENTANYL CITRATE 50 MCG: 50 INJECTION, SOLUTION INTRAMUSCULAR; INTRAVENOUS at 07:48

## 2023-04-17 RX ADMIN — SUGAMMADEX 100 MG: 100 INJECTION, SOLUTION INTRAVENOUS at 09:52

## 2023-04-17 RX ADMIN — LIDOCAINE HYDROCHLORIDE 20 MG: 20 INJECTION, SOLUTION INTRAVENOUS at 07:37

## 2023-04-17 RX ADMIN — ROCURONIUM BROMIDE 50 MG: 10 INJECTION, SOLUTION INTRAVENOUS at 08:20

## 2023-04-17 RX ADMIN — LABETALOL HYDROCHLORIDE 10 MG: 5 INJECTION, SOLUTION INTRAVENOUS at 10:46

## 2023-04-17 RX ADMIN — FENTANYL CITRATE 50 MCG: 50 INJECTION, SOLUTION INTRAMUSCULAR; INTRAVENOUS at 09:01

## 2023-04-17 RX ADMIN — KETOROLAC TROMETHAMINE 30 MG: 30 INJECTION, SOLUTION INTRAMUSCULAR; INTRAVENOUS at 09:47

## 2023-04-17 RX ADMIN — PROPOFOL 200 MG: 10 INJECTION, EMULSION INTRAVENOUS at 07:39

## 2023-04-17 RX ADMIN — OXYCODONE 5 MG: 5 TABLET ORAL at 12:50

## 2023-04-17 RX ADMIN — DEXAMETHASONE SODIUM PHOSPHATE 4 MG: 4 INJECTION, SOLUTION INTRAMUSCULAR; INTRAVENOUS at 08:40

## 2023-04-17 RX ADMIN — SODIUM CHLORIDE: 9 INJECTION, SOLUTION INTRAVENOUS at 06:37

## 2023-04-17 RX ADMIN — FENTANYL CITRATE 50 MCG: 50 INJECTION, SOLUTION INTRAMUSCULAR; INTRAVENOUS at 09:24

## 2023-04-17 RX ADMIN — PROPOFOL 50 MG: 10 INJECTION, EMULSION INTRAVENOUS at 07:47

## 2023-04-17 RX ADMIN — ROCURONIUM BROMIDE 20 MG: 10 INJECTION, SOLUTION INTRAVENOUS at 09:19

## 2023-04-17 RX ADMIN — SODIUM CHLORIDE, POTASSIUM CHLORIDE, SODIUM LACTATE AND CALCIUM CHLORIDE: 600; 310; 30; 20 INJECTION, SOLUTION INTRAVENOUS at 09:10

## 2023-04-17 RX ADMIN — FENTANYL CITRATE 50 MCG: 0.05 INJECTION, SOLUTION INTRAMUSCULAR; INTRAVENOUS at 10:59

## 2023-04-17 RX ADMIN — METHYLENE BLUE 50 MG: 5 INJECTION INTRAVENOUS at 08:57

## 2023-04-17 RX ADMIN — SUGAMMADEX 100 MG: 100 INJECTION, SOLUTION INTRAVENOUS at 09:54

## 2023-04-17 RX ADMIN — SUGAMMADEX 100 MG: 100 INJECTION, SOLUTION INTRAVENOUS at 09:53

## 2023-04-17 RX ADMIN — CEFAZOLIN 3000 MG: 10 INJECTION, POWDER, FOR SOLUTION INTRAVENOUS at 07:45

## 2023-04-17 RX ADMIN — ROPIVACAINE HYDROCHLORIDE 30 ML: 5 INJECTION, SOLUTION EPIDURAL; INFILTRATION; PERINEURAL at 08:15

## 2023-04-17 RX ADMIN — ROCURONIUM BROMIDE 20 MG: 10 INJECTION, SOLUTION INTRAVENOUS at 08:51

## 2023-04-17 RX ADMIN — DIPHENHYDRAMINE HYDROCHLORIDE 12.5 MG: 50 INJECTION INTRAMUSCULAR; INTRAVENOUS at 08:41

## 2023-04-17 RX ADMIN — FENTANYL CITRATE 50 MCG: 0.05 INJECTION, SOLUTION INTRAMUSCULAR; INTRAVENOUS at 11:11

## 2023-04-17 RX ADMIN — PROPOFOL 50 MG: 10 INJECTION, EMULSION INTRAVENOUS at 08:08

## 2023-04-17 RX ADMIN — MIDAZOLAM HYDROCHLORIDE 2 MG: 1 INJECTION, SOLUTION INTRAMUSCULAR; INTRAVENOUS at 07:30

## 2023-04-17 RX ADMIN — SUGAMMADEX 100 MG: 100 INJECTION, SOLUTION INTRAVENOUS at 09:55

## 2023-04-17 RX ADMIN — METRONIDAZOLE 500 MG: 500 INJECTION, SOLUTION INTRAVENOUS at 07:45

## 2023-04-17 RX ADMIN — FENTANYL CITRATE 50 MCG: 50 INJECTION, SOLUTION INTRAMUSCULAR; INTRAVENOUS at 08:39

## 2023-04-17 RX ADMIN — ONDANSETRON 4 MG: 2 INJECTION INTRAMUSCULAR; INTRAVENOUS at 09:46

## 2023-04-17 ASSESSMENT — PAIN DESCRIPTION - ORIENTATION
ORIENTATION: LOWER
ORIENTATION: LOWER

## 2023-04-17 ASSESSMENT — PAIN SCALES - GENERAL
PAINLEVEL_OUTOF10: 0
PAINLEVEL_OUTOF10: 5
PAINLEVEL_OUTOF10: 5
PAINLEVEL_OUTOF10: 4

## 2023-04-17 ASSESSMENT — PAIN DESCRIPTION - LOCATION
LOCATION: ABDOMEN

## 2023-04-17 ASSESSMENT — PAIN DESCRIPTION - DESCRIPTORS
DESCRIPTORS: PRESSURE
DESCRIPTORS: CRAMPING
DESCRIPTORS: PRESSURE

## 2023-04-17 ASSESSMENT — PAIN - FUNCTIONAL ASSESSMENT: PAIN_FUNCTIONAL_ASSESSMENT: ACTIVITIES ARE NOT PREVENTED

## 2023-04-17 NOTE — DISCHARGE INSTRUCTIONS
Patient Discharge Instructions  Discharge Date:  4/17/2023    Discharged To: Home    RESUME ACTIVITY:      BATHING: yes    DRIVING: No driving until walking comfortably and off pain meds    RETURN TO WORK: may return to work on 1 week    WALKING:  Encourage to walk as much as comfortable    SEXUAL ACTIVITY: no    STAIRS:  Yes    LIFTING: Less than 5 pounds for 1 weeks    DIET: Light diet today and resume normal diet tomorrow    WOUND CARE: May remove dressing tomorrow then leave open to the air. Leave steri strips on until peel off (usually 5 to 10 days)    SPECIAL INSTRUCTIONS:     Call the office at [phone] you have a fever > 101F, or if your incision becomes red, tender, or drains more than a small amount of clear fluid.

## 2023-04-17 NOTE — OP NOTE
less than 5 mmHg and no bleeding was noted. The ureters were not visualized bilaterally and peristalsis was not seen. There was no evidence of bowel bladder or ureter injury. Surgicel was applied to the surgical bed. The patient's abdomen was then deflated, again ensuring excellent hemostasis, and the trochars were removed under direct visualization. The patient's skin incision were closed with a subcuticular stitch utilizing 4-0 Vicryl  Cytoscopy was  performed. She had received methylene blue. Ureteral reflux noted from both ureters. Evidence of bladder injury    FINDINGS:  The ovaries are normal and remained in situ. The tubes have been previously  with Hulka clips there was some scarring around the tubes the mid portions were adherent to the pelvic sidewall bilaterally. The uterus weighed 99 g. The procedure was uncomplicated. I could not see the ureters laparoscopically so I did a cystoscopy and bilateral ureteral reflux was noted    Postoperative findings were discussed with the patient's family. She was given discharge instructions, prescriptions for analgesics. She will follow up in my office in a for reevaluation. Electronically signed by Nj Bella DO on 4/17/23. Please note this report has been partially produced using speech recognition software and may cause contain errors related to that system including grammar, punctuation and spelling as well as words and phrases that may seem inappropriate. If there are questions or concerns please feel free to contact me to clarify.

## 2023-04-17 NOTE — ANESTHESIA PRE PROCEDURE
complications:   Airway: Mallampati: II  TM distance: >3 FB   Neck ROM: full  Mouth opening: > = 3 FB   Dental: normal exam         Pulmonary:Negative Pulmonary ROS breath sounds clear to auscultation                             Cardiovascular:  Exercise tolerance: good (>4 METS),   (+) hypertension:, hyperlipidemia      ECG reviewed  Rhythm: regular  Rate: normal           Beta Blocker:  Not on Beta Blocker         Neuro/Psych:   (+) psychiatric history:            GI/Hepatic/Renal:   (+) GERD:, morbid obesity          Endo/Other:    (+) Diabetes, . Pt had PAT visit. Abdominal:             Vascular: negative vascular ROS. Other Findings:           Anesthesia Plan      general and regional     ASA 3     (B/l TAP  ETT)  Induction: intravenous. MIPS: Postoperative opioids intended and Prophylactic antiemetics administered. Anesthetic plan and risks discussed with patient.         Attending anesthesiologist reviewed and agrees with Preprocedure content      Post-op pain plan if not by surgeon: karen Atwood DO   4/16/2023

## 2023-04-17 NOTE — ANESTHESIA PROCEDURE NOTES
Peripheral Block    Patient location during procedure: OR  Reason for block: post-op pain management and at surgeon's request  Start time: 4/17/2023 8:05 AM  End time: 4/17/2023 8:15 AM  Staffing  Performed: anesthesiologist   Anesthesiologist: Lord Goltz, DO  Preanesthetic Checklist  Completed: patient identified, IV checked, site marked, risks and benefits discussed, surgical/procedural consents, equipment checked, pre-op evaluation, timeout performed, anesthesia consent given, oxygen available and monitors applied/VS acknowledged  Peripheral Block   Patient position: supine  Prep: ChloraPrep  Provider prep: mask and sterile gloves (Sterile probe cover)  Patient monitoring: cardiac monitor, continuous pulse ox, frequent blood pressure checks, IV access, continuous capnometry and oxygen  Block type: TAP  Laterality: bilateral  Injection technique: single-shot  Guidance: nerve stimulator and ultrasound guided  Local infiltration: ropivacaine  Infiltration strength: 0.5 %  Local infiltration: ropivacaine  Dose: 30 mL    Needle   Needle type: combined needle/nerve stimulator   Needle gauge: 22 G  Needle localization: anatomical landmarks and ultrasound guidance  Needle length: 10 cm  Assessment   Injection assessment: negative aspiration for heme, no paresthesia on injection and local visualized surrounding nerve on ultrasound  Paresthesia pain: immediately resolved  Slow fractionated injection: yes  Hemodynamics: stable  Real-time US image taken/store: yes  Outcomes: uncomplicated and patient tolerated procedure well    Additional Notes  Ultrasound image printed and saved in patient chart. Sterile probe cover used. 30 mL of 0.5% ropivacaine diluted in 30 cc of 0.9% NS for a total of 60 mL of 0.25% ropivacaine.  60 mL of 0.25% ropivacaine utilized in total, 30 cc given per side     Medications Administered  ropivacaine (NAROPIN) injection 0.5% - Perineural   30 mL - 4/17/2023 8:15:00 AM

## 2023-04-17 NOTE — ANESTHESIA POSTPROCEDURE EVALUATION
Department of Anesthesiology  Postprocedure Note    Patient: Abiodun Rosado  MRN: [id number]  [place]: 1975  Date of evaluation: 4/17/2023      Procedure Summary     Date: 04/17/23 Room / Location: [address]    Anesthesia Start: 0730 Anesthesia Stop: 1010    Procedure: Laparoscopic Assisted Vaginal Hysterectomy, Bilateral Salpingectomy, Possible Total Abdominal Hysterectomy, Bilateral Salpingo-Oophorectomy, #LBS (Bilateral: Abdomen/Perineum) Diagnosis:       Pelvic pain in female      Menorrhagia      Abnormal uterine bleeding      (Pelvic pain in female [R10.2])      (Menorrhagia [N92.0])      (Abnormal uterine bleeding [N93.9])    Surgeons: Renato Teresa DO Responsible Provider: Twin Rudd DO    Anesthesia Type: general, regional ASA Status: 3          Anesthesia Type: No value filed.     Melanie Phase I: Melanie Score: 8    Melanie Phase II:        Anesthesia Post Evaluation    Patient location during evaluation: PACU  Patient participation: waiting for patient participation  Level of consciousness: sleepy but conscious  Pain score: 0  Airway patency: patent  Nausea & Vomiting: no nausea  Complications: no  Cardiovascular status: blood pressure returned to baseline and hemodynamically stable  Respiratory status: acceptable, spontaneous ventilation and face mask  Hydration status: euvolemic  Multimodal analgesia pain management approach

## 2023-04-17 NOTE — BRIEF OP NOTE
Brief Postoperative Note      Patient: Dora Christensen  YOB: 1975  MRN: [id number]    Date of Procedure: 4/17/2023    Pre-Op Diagnosis Codes:     * Pelvic pain in female [R10.2]     * Menorrhagia [N92.0]     * Abnormal uterine bleeding [N93.9]  Morbid obesity  Post-Op Diagnosis: Same       Procedure(s):  Laparoscopic Assisted Vaginal Hysterectomy, Bilateral Salpingectomy, Possible Total Abdominal Hysterectomy, Bilateral Salpingo-Oophorectomy, #LBS  cystoscopy  Surgeon(s):  Vel Avalos DO    Assistant:  First Assistant: [address]    Anesthesia: General    Estimated Blood Loss (mL): less than 674     Complications: None    Specimens:   ID Type Source Tests Collected by Time Destination   A : Uterus, Cervix, Bilateral Fallopian Tubes Tissue Uterus [facility] DO Heladio 4/17/2023 0859        Implants:  * No implants in log *      Drains: * No LDAs found *    Findings: 99.7 gram uterus. Tubes removed. Prior hulka clip application. Ovaries remain in situ.        Electronically signed by Vel Avalos DO on 4/17/2023 at 9:49 AM

## 2023-04-17 NOTE — INTERVAL H&P NOTE
Update History & Physical    The patient's History and Physical of April 10, 2023 was reviewed with the patient and I examined the patient. There was no change. The surgical site was confirmed by the patient and me. Plan: The risks, benefits, expected outcome, and alternative to the recommended procedure have been discussed with the patient. Patient understands and wants to proceed with the procedure.      Electronically signed by Ramila Serrato DO on 4/17/2023 at 7:32 AM

## 2023-04-25 ENCOUNTER — OFFICE VISIT (OUTPATIENT)
Dept: OBGYN CLINIC | Age: 48
End: 2023-04-25
Payer: COMMERCIAL

## 2023-04-25 VITALS — SYSTOLIC BLOOD PRESSURE: 126 MMHG | DIASTOLIC BLOOD PRESSURE: 82 MMHG | WEIGHT: 293 LBS | BODY MASS INDEX: 54.74 KG/M2

## 2023-04-25 DIAGNOSIS — R10.2 PELVIC PRESSURE IN FEMALE: Primary | ICD-10-CM

## 2023-04-25 DIAGNOSIS — R10.2 PELVIC PRESSURE IN FEMALE: ICD-10-CM

## 2023-04-25 LAB
BILIRUB UR QL STRIP: NEGATIVE
CLARITY UR: ABNORMAL
COLOR UR: YELLOW
GLUCOSE UR STRIP-MCNC: NEGATIVE MG/DL
HGB UR QL STRIP: NEGATIVE
KETONES UR STRIP-MCNC: ABNORMAL MG/DL
LEUKOCYTE ESTERASE UR QL STRIP: NEGATIVE
NITRITE UR QL STRIP: NEGATIVE
PH UR STRIP: 5.5 [PH] (ref 5–9)
PROT UR STRIP-MCNC: NEGATIVE MG/DL
SP GR UR STRIP: 1.04 (ref 1–1.03)
UROBILINOGEN UR STRIP-ACNC: 0.2 E.U./DL

## 2023-04-25 PROCEDURE — 99213 OFFICE O/P EST LOW 20 MIN: CPT | Performed by: OBSTETRICS & GYNECOLOGY

## 2023-04-25 RX ORDER — FLUTICASONE PROPIONATE 50 MCG
SPRAY, SUSPENSION (ML) NASAL
COMMUNITY
Start: 2023-04-21

## 2023-04-25 NOTE — PROGRESS NOTES
[facility] Op Exam     Chief Complaint   Patient presents with    Post-Op Check     Feels pressure       Saida Perry is a 41y.o. year old female who presentsto the office  1 weeks status post LAVHfor abnormal uterine bleeding. Bowel movements are Normal.  The patient is not having any pain.       Past Medical History:   Diagnosis Date    Breast disorder     lump  normal tiago    Depression     Diabetes mellitus ([address]     Menopausal symptoms      Past Surgical History:   Procedure Laterality Date     SECTION      DILATION AND CURETTAGE      ENDOMETRIAL BIOPSY      HYSTERECTOMY, VAGINAL Bilateral 2023    Laparoscopic Assisted Vaginal Hysterectomy, Bilateral Salpingectomy performed by Adelso Noble DO at [facility]  2022     Family History   Problem Relation Age of Onset    High Blood Pressure Mother     Stroke Father     Diabetes Father     Diabetes Sister     Stroke Sister     Heart Attack Sister     Asthma Daughter     Breast Cancer Neg Hx      Social History     Socioeconomic History    Marital status:      Spouse name: Not on file    Number of children: Not on file    Years of education: Not on file    Highest education level: Not on file   Occupational History    Not on file   Tobacco Use    Smoking status: Never    Smokeless tobacco: Never   Vaping Use    Vaping Use: Never used   Substance and Sexual Activity    Alcohol use: Not Currently    Drug use: Never    Sexual activity: Not on file   Other Topics Concern    Not on file   Social History Narrative    Not on file     Social Determinants of Health     Financial Resource Strain: Not on file   Food Insecurity: Not on file   Transportation Needs: Not on file   Physical Activity: Not on file   Stress: Not on file   Social Connections: Not on file   Intimate Partner Violence: Not on file   Housing Stability: Not on file     Allergies:  Bee venom, Shellfish-derived products, Escitalopram oxalate, Moxifloxacin,

## 2023-04-27 LAB — BACTERIA UR CULT: NORMAL

## 2023-05-30 ENCOUNTER — OFFICE VISIT (OUTPATIENT)
Dept: OBGYN CLINIC | Age: 48
End: 2023-05-30
Payer: COMMERCIAL

## 2023-05-30 VITALS
WEIGHT: 293 LBS | SYSTOLIC BLOOD PRESSURE: 148 MMHG | DIASTOLIC BLOOD PRESSURE: 88 MMHG | BODY MASS INDEX: 55.06 KG/M2 | HEART RATE: 90 BPM

## 2023-05-30 DIAGNOSIS — N92.0 MENORRHAGIA WITH REGULAR CYCLE: Primary | ICD-10-CM

## 2023-05-30 PROBLEM — K21.9 GASTROESOPHAGEAL REFLUX DISEASE: Status: ACTIVE | Noted: 2023-05-30

## 2023-05-30 PROBLEM — G43.909 MIGRAINE HEADACHE: Status: ACTIVE | Noted: 2023-05-30

## 2023-05-30 PROBLEM — E78.5 HYPERLIPIDEMIA: Status: ACTIVE | Noted: 2023-05-30

## 2023-05-30 PROBLEM — E83.51 HYPOCALCEMIA: Status: ACTIVE | Noted: 2023-05-30

## 2023-05-30 PROBLEM — E34.9 DISORDER OF ENDOCRINE SYSTEM: Status: ACTIVE | Noted: 2023-05-30

## 2023-05-30 PROBLEM — E66.01 MORBID OBESITY (HCC): Status: ACTIVE | Noted: 2023-05-30

## 2023-05-30 PROBLEM — J30.2 SEASONAL ALLERGIC RHINITIS: Status: ACTIVE | Noted: 2023-05-30

## 2023-05-30 PROBLEM — Z71.3 DIETARY COUNSELING: Status: ACTIVE | Noted: 2023-05-30

## 2023-05-30 PROBLEM — F41.9 ANXIETY: Status: ACTIVE | Noted: 2023-05-30

## 2023-05-30 PROBLEM — M94.8X9 PERICHONDRITIS: Status: ACTIVE | Noted: 2023-05-30

## 2023-05-30 PROBLEM — E11.9 TYPE 2 DIABETES MELLITUS (HCC): Status: ACTIVE | Noted: 2023-05-30

## 2023-05-30 PROCEDURE — 99213 OFFICE O/P EST LOW 20 MIN: CPT | Performed by: OBSTETRICS & GYNECOLOGY

## 2023-05-30 RX ORDER — LISINOPRIL 2.5 MG/1
2.5 TABLET ORAL DAILY
COMMUNITY
Start: 2023-05-20

## 2023-05-30 SDOH — ECONOMIC STABILITY: HOUSING INSECURITY
IN THE LAST 12 MONTHS, WAS THERE A TIME WHEN YOU DID NOT HAVE A STEADY PLACE TO SLEEP OR SLEPT IN A SHELTER (INCLUDING NOW)?: NO

## 2023-05-30 SDOH — ECONOMIC STABILITY: FOOD INSECURITY: WITHIN THE PAST 12 MONTHS, THE FOOD YOU BOUGHT JUST DIDN'T LAST AND YOU DIDN'T HAVE MONEY TO GET MORE.: NEVER TRUE

## 2023-05-30 SDOH — ECONOMIC STABILITY: INCOME INSECURITY: HOW HARD IS IT FOR YOU TO PAY FOR THE VERY BASICS LIKE FOOD, HOUSING, MEDICAL CARE, AND HEATING?: NOT HARD AT ALL

## 2023-05-30 SDOH — ECONOMIC STABILITY: FOOD INSECURITY: WITHIN THE PAST 12 MONTHS, YOU WORRIED THAT YOUR FOOD WOULD RUN OUT BEFORE YOU GOT MONEY TO BUY MORE.: NEVER TRUE

## 2023-05-30 ASSESSMENT — PATIENT HEALTH QUESTIONNAIRE - PHQ9
SUM OF ALL RESPONSES TO PHQ QUESTIONS 1-9: 0
SUM OF ALL RESPONSES TO PHQ9 QUESTIONS 1 & 2: 0
SUM OF ALL RESPONSES TO PHQ QUESTIONS 1-9: 0
2. FEELING DOWN, DEPRESSED OR HOPELESS: 0
SUM OF ALL RESPONSES TO PHQ QUESTIONS 1-9: 0
1. LITTLE INTEREST OR PLEASURE IN DOING THINGS: 0
SUM OF ALL RESPONSES TO PHQ QUESTIONS 1-9: 0

## 2023-05-30 ASSESSMENT — ENCOUNTER SYMPTOMS
NAUSEA: 0
COLOR CHANGE: 0
BLOOD IN STOOL: 0
VOMITING: 0
VOICE CHANGE: 0
SHORTNESS OF BREATH: 0
BACK PAIN: 0
ABDOMINAL PAIN: 0
WHEEZING: 0
CONSTIPATION: 0
TROUBLE SWALLOWING: 0
SORE THROAT: 0
ABDOMINAL DISTENTION: 0
CHEST TIGHTNESS: 0
COUGH: 0

## 2023-05-30 NOTE — PROGRESS NOTES
bruise/bleed easily. Psychiatric/Behavioral:  Negative for agitation, behavioral problems, confusion, decreased concentration, dysphoric mood and suicidal ideas. The patient is not nervous/anxious and is not hyperactive. Physical Exam  Vitals and nursing note reviewed. Exam conducted with a chaperone present. Constitutional:       Appearance: She is well-developed. HENT:      Head: Normocephalic and atraumatic. Eyes:      Pupils: Pupils are equal, round, and reactive to light. Neck:      Thyroid: No thyromegaly. Trachea: No tracheal deviation. Cardiovascular:      Rate and Rhythm: Normal rate and regular rhythm. Heart sounds: Normal heart sounds. Pulmonary:      Effort: Pulmonary effort is normal.      Breath sounds: Normal breath sounds. Chest:      Chest wall: No tenderness. Abdominal:      General: Bowel sounds are normal. There is no distension. Palpations: Abdomen is soft. There is no mass. Tenderness: There is no abdominal tenderness. There is no guarding or rebound. Hernia: There is no hernia in the left inguinal area. Genitourinary:     Labia:         Right: No rash, tenderness, lesion or injury. Left: No rash, tenderness, lesion or injury. Vagina: Normal. No foreign body. No vaginal discharge, erythema, tenderness or bleeding. Adnexa:         Right: No mass, tenderness or fullness. Left: No mass, tenderness or fullness. Rectum: Normal. No mass, tenderness, anal fissure, external hemorrhoid or internal hemorrhoid. Normal anal tone. Musculoskeletal:         General: Normal range of motion. Cervical back: Normal range of motion. Lymphadenopathy:      Cervical: No cervical adenopathy. Neurological:      Mental Status: She is alert and oriented to person, place, and time.        Vitals:    05/30/23 1516 05/30/23 1517   BP: (!) 150/90 (!) 148/88   Pulse: 90    Weight: (!) 336 lb (152.4 kg)

## 2023-06-12 PROBLEM — F43.10 PTSD (POST-TRAUMATIC STRESS DISORDER): Status: ACTIVE | Noted: 2023-05-14

## 2025-03-10 ENCOUNTER — HOSPITAL ENCOUNTER (OUTPATIENT)
Dept: RADIOLOGY | Facility: EXTERNAL LOCATION | Age: 50
Discharge: HOME | End: 2025-03-10
Payer: COMMERCIAL

## 2025-03-12 NOTE — PROGRESS NOTES
Subjective     HPI  Cheri Rivas is a 49 y.o. 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  She has a complicated history of undergoing laparoscopic converted to open subtotal fenestrating cholecystectomy in August 2023.  This was complicated by bile leak which was managed with ERCP, drains and also had an open wound which healed by secondary intention.  She ultimately healed up from that but in early to middle 2024 had issues again with biliary leak and abdominal wall fluid collection requiring wound management as well as ERCP with stent placement.  Once again she was found to have persistent biliary leak.  She underwent stent removal again in February 2025 and within a a week required replacement of the stent.  She has imaging which I have reviewed including multiple CT scans showing remnant gallbladder with a gallstone in place.  There is a fistula tract extending to the abdominal wall musculature.  She has been offered a remnant cholecystectomy by her team at the [facility].  She is here for a second opinion to see if there are any other less invasive options.    Past medical history significant for diabetes, hypercholesterolemia, GERD, anxiety    Past abdominal surgical history is significant for open cholecystectomy, hysterectomy.    Review of Systems   Constitutional: Negative.    HENT:  Negative for ear discharge, nosebleeds and sore throat.    Eyes:  Negative for discharge.   Respiratory:  Negative for shortness of breath.    Cardiovascular:  Negative for chest pain.   Gastrointestinal:  Negative for abdominal pain, nausea and vomiting.   Endocrine: Negative.    Genitourinary:  Negative for dysuria and flank pain.   Musculoskeletal:  Negative for gait problem.   Skin:  Negative for rash.   Neurological:  Negative for speech difficulty, weakness and headaches.   Hematological:  Negative for adenopathy.   Psychiatric/Behavioral:  Negative for behavioral  problems, confusion and hallucinations.         No past medical history on file.   No past surgical history on file.   No current outpatient medications on file prior to visit.     No current facility-administered medications on file prior to visit.      Not on File   Social History     Socioeconomic History    Marital status: Single     Spouse name: Not on file    Number of children: Not on file    Years of education: Not on file    Highest education level: Not on file   Occupational History    Not on file   Tobacco Use    Smoking status: Not on file    Smokeless tobacco: Not on file   Substance and Sexual Activity    Alcohol use: Not on file    Drug use: Not on file    Sexual activity: Not on file   Other Topics Concern    Not on file   Social History Narrative    Not on file     Social Drivers of Health     Financial Resource Strain: Low Risk  (5/30/2023)    Received from [email or web address]    Overall Financial Resource Strain (CARDIA)     Difficulty of Paying Living Expenses: Not hard at all   Food Insecurity: No Food Insecurity (5/30/2023)    Received from [email or web address]    Hunger Vital Sign     Worried About Running Out of Food in the Last Year: Never true     Ran Out of Food in the Last Year: Never true   Transportation Needs: Unknown (5/30/2023)    Received from [email or web address]    PRAPARE - Transportation     Lack of Transportation (Medical): Not on file     Lack of Transportation (Non-Medical): No   Physical Activity: Not on file   Stress: Not on file   Social Connections: Not on file   Intimate Partner Violence: Not on file   Housing Stability: Unknown (5/30/2023)    Received from [email or web address]    Housing Stability Vital Sign     Unable to Pay for Housing in the Last Year: Not on file     Number of Places Lived in the Last Year: Not on file     Unstable Housing in the Last Year: No      No family history on file.       Objective      Vitals:    03/13/25 1241   BP: 146/81   Pulse: 92   Resp: 16   Temp: 36.1 °C (97 °F)   SpO2: 97%          Physical Exam  Constitutional:       Appearance: Normal appearance.   HENT:      Head: Atraumatic.      Nose: Nose normal.   Eyes:      Extraocular Movements: Extraocular movements intact.      Conjunctiva/sclera: Conjunctivae normal.   Cardiovascular:      Rate and Rhythm: Normal rate.   Pulmonary:      Effort: Pulmonary effort is normal.   Abdominal:      Palpations: Abdomen is soft.      Tenderness: There is no abdominal tenderness.      Comments: Soft obese abdomen with a long right subcostal scar.  At the lateral aspect of the scar there is a dimpling where she notes the biliocutaneous fistula had been.   Musculoskeletal:         General: Normal range of motion.   Skin:     Findings: No rash.   Neurological:      General: No focal deficit present.      Mental Status: She is alert.   Psychiatric:         Behavior: Behavior normal.            ERCP    Result Date: 3/3/2025  Q3 Patient Name: Cheri Rivas Procedure Date: 3/3/2025 9:19 AM MRN: [id number] YOB: 1975 Admit Type: Outpatient Age: 49 Gender: Female Note Status: Finalized Attending MD: Ariel Villarreal MD, [id number] Procedure:             ERCP Indications:           Bile duct stricture, Bile leak, Treatment of bile                        leak Providers:             Ariel Villarreal MD Patient Profile:       This is a 49 year old female. Refer to note in                        patient chart for documentation of history and                        physical. Referring Physician:   Ariel Villarreal MD (Referring MD) Medicines:             General Anesthesia Complications:         No immediate complications. Estimated blood loss:                        Minimal. Requesting Provider:   Procedure:             Pre-Anesthesia Assessment:                        - ASA Grade Assessment: II - A patient with mild                        systemic  disease.                        After obtaining informed consent, the scope was                        passed under direct vision. Throughout the                        procedure, the patient's blood pressure, pulse, and                        oxygen saturations were monitored continuously. The                        Duodenoscope was introduced through the mouth, and                        advanced to the duodenum and used to inject                        contrast into the bile duct. The ERCP was                        accomplished without difficulty. The patient                        tolerated the procedure well. Moderate Sedation:      MAC anesthesia was administered by the anesthesia team. Findings:      The  film was normal. The upper GI tract was traversed under      direct vision without detailed examination. A large amount of food      (residue) was found in the entire examined stomach. A biliary      sphincterotomy had been performed. The sphincterotomy appeared open.      The bile duct was deeply cannulated with the 11.5 mm balloon and      guidewire. Contrast was injected. I personally interpreted the bile      duct images. There was brisk flow of contrast through the ducts.      Image quality was excellent. Contrast extended to the main bile duct.      Contrast extended to the cystic duct. The cystic duct contained a      single severe stenosis 5 mm in length. The cystic duct was      successfully dilated with a 4 mm x 2 cm CRE Hurricane biliary balloon      dilator. One 7 Fr by 15 cm transpapillary plastic stent with two      external flaps and two internal flaps was placed 14 cm into the      cystic duct. Bile flowed through the stent. The stent was in good      position. Impression:            - A large amount of food (residue) in the stomach.                        - Prior biliary sphincterotomy appeared open.                        - A single severe biliary stricture was found in                         the cystic duct.                        - The cystic duct was successfully dilated.                        - One plastic stent was placed into the cystic duct. Estimated Blood Loss:  Estimated blood loss was minimal. Recommendation:        - Repeat ERCP in 2 months for retreatment. Procedure Code(s):     --- Professional ---                        43412                        69780 CPT copyright 2021 American Medical Association. All rights reserved. Attending Participation:      I personally performed the entire procedure. Scope In: 9:39:07 AM Scope Out: 10:17:35 AM MD Ariel Kendrick MD 3/3/2025 10:31:48 AM This report has been signed electronically by Ariel Villarreal MD Number of Addenda: 0 Note Initiated On: 3/3/2025 9:19 AM    ERCP    Result Date: 3/3/2025  Q3 Patient Name: Cheri Rivas Procedure Date: 3/3/2025 9:19 AM MRN: [id number] YOB: 1975 Admit Type: Outpatient Age: 49 Gender: Female Note Status: Finalized Attending MD: Ariel Villarreal MD, [id number] Procedure:             ERCP Indications:           Bile duct stricture, Bile leak, Treatment of bile                        leak Providers:             Ariel Villarreal MD Patient Profile:       This is a 49 year old female. Refer to note in                        patient chart for documentation of history and                        physical. Referring Physician:   Ariel Villarreal MD (Referring MD) Medicines:             General Anesthesia Complications:         No immediate complications. Estimated blood loss:                        Minimal. Requesting Provider:   Procedure:             Pre-Anesthesia Assessment:                        - ASA Grade Assessment: II - A patient with mild                        systemic disease.                        After obtaining informed consent, the scope was                        passed under direct vision. Throughout the                        procedure, the patient's blood  pressure, pulse, and                        oxygen saturations were monitored continuously. The                        Duodenoscope was introduced through the mouth, and                        advanced to the duodenum and used to inject                        contrast into the bile duct. The ERCP was                        accomplished without difficulty. The patient                        tolerated the procedure well. Moderate Sedation:      MAC anesthesia was administered by the anesthesia team. Findings:      The  film was normal. The upper GI tract was traversed under      direct vision without detailed examination. A large amount of food      (residue) was found in the entire examined stomach. A biliary      sphincterotomy had been performed. The sphincterotomy appeared open.      The bile duct was deeply cannulated with the 11.5 mm balloon and      guidewire. Contrast was injected. I personally interpreted the bile      duct images. There was brisk flow of contrast through the ducts.      Image quality was excellent. Contrast extended to the main bile duct.      Contrast extended to the cystic duct. The cystic duct contained a      single severe stenosis 5 mm in length. The cystic duct was      successfully dilated with a 4 mm x 2 cm CRE Hurricane biliary balloon      dilator. One 7 Fr by 15 cm transpapillary plastic stent with two      external flaps and two internal flaps was placed 14 cm into the      cystic duct. Bile flowed through the stent. The stent was in good      position. Impression:            - A large amount of food (residue) in the stomach.                        - Prior biliary sphincterotomy appeared open.                        - A single severe biliary stricture was found in                        the cystic duct.                        - The cystic duct was successfully dilated.                        - One plastic stent was placed into the cystic duct. Estimated Blood Loss:   Estimated blood loss was minimal. Recommendation:        - Repeat ERCP in 2 months for retreatment. Procedure Code(s):     --- Professional ---                        29061                        41816 CPT copyright 2021 American Medical Association. All rights reserved. Attending Participation:      I personally performed the entire procedure. Scope In: 9:39:07 AM Scope Out: 10:17:35 AM MD Ariel Kendrick MD 3/3/2025 10:31:48 AM This report has been signed electronically by Ariel Villarreal MD Number of Addenda: 0 Note Initiated On: 3/3/2025 9:19 AM    CT-CT abdomen pelvis w con IMPORT    Result Date: 2/25/2025  Images were obtained outside of [facility]    CA-CT abdomen pelvis w con IMPORT    Result Date: 2/25/2025  Images were obtained outside of [facility]    ERCP    Result Date: 2/17/2025  Q3 Patient Name: Cheri Rivas Procedure Date: 2/17/2025 8:52 AM MRN: [id number] YOB: 1975 Admit Type: Outpatient Age: 49 Gender: Female Note Status: Finalized Attending MD: Ariel Villarreal MD, [id number] Procedure:             ERCP Indications:           Bile leak, Treatment of bile leak Providers:             Ariel Villarreal MD Referring Physician:   Yari Finch MD (Referring MD) Medicines:             General Anesthesia Complications:         No immediate complications. Requesting Provider:   Procedure:             Pre-Anesthesia Assessment:                        - ASA Grade Assessment: II - A patient with mild                        systemic disease.                        After obtaining informed consent, the scope was                        passed under direct vision. Throughout the                        procedure, the patient's blood pressure, pulse, and                        oxygen saturations were monitored continuously. The                        Duodenoscope was introduced through the mouth, and                        advanced to the  duodenum and used to cannulate the                        bile duct. The ERCP was accomplished without                        difficulty. The patient tolerated the procedure                        well. Moderate Sedation:      GA Findings:      A biliary stent was visible on the  film. One biliary stent      originating in the cystic duct was emerging from the major papilla.      The stent was visibly occluded. One stent was removed from the      biliary tree using a snare. The bile duct was deeply cannulated with      the 8.5 mm balloon and guidewire. Contrast was injected. I personally      interpreted the bile duct images. There was brisk flow of contrast      through the ducts. Image quality was excellent. Contrast extended to      the main bile duct. Contrast extended to the cystic duct. Contrast      extended to the bifurcation. Contrast extended to the hepatic ducts.        The intra-hepatic and extra-hepatic biliary duct system was normal.      To discover objects, the biliary tree was swept with an 11.5 mm      balloon starting at the bifurcation. Nothing was found. Impression:            - One visibly occluded stent from the cystic duct                        was seen in the major papilla.                        - The cholangiogram was normal.                        - One stent was removed from the biliary tree.                        - The biliary tree was swept and nothing was found. Estimated Blood Loss:  Estimated blood loss: none. Recommendation:        - Patient has a contact number available for                        emergencies. The signs and symptoms of potential                        delayed complications were discussed with the                        patient. Return to normal activities tomorrow.                        Written discharge instructions were provided to the                        patient.                        - Resume previous diet.                        - Continue present  medications. Procedure Code(s):     --- Professional ---                        54024                        29528 Diagnosis Code(s):     --- Professional ---                        T85.590A                        Z46.59                        K83.8 CPT copyright 2021 American Medical Association. All rights reserved. Attending Participation:      I personally performed the entire procedure. Scope In: 9:08:03 AM Scope Out: 9:28:48 AM MD Ariel Kendrick MD 2/17/2025 9:58:59 AM This report has been signed electronically by Ariel Villarreal MD Number of Addenda: 0 Note Initiated On: 2/17/2025 8:52 AM    ERCP    Result Date: 2/17/2025  Q3 Patient Name: Cheri Rivas Procedure Date: 2/17/2025 8:52 AM MRN: [id number] YOB: 1975 Admit Type: Outpatient Age: 49 Gender: Female Note Status: Finalized Attending MD: Ariel Villarreal MD, [id number] Procedure:             ERCP Indications:           Bile leak, Treatment of bile leak Providers:             Ariel Villarreal MD Referring Physician:   Yari Finch MD (Referring MD) Medicines:             General Anesthesia Complications:         No immediate complications. Requesting Provider:   Procedure:             Pre-Anesthesia Assessment:                        - ASA Grade Assessment: II - A patient with mild                        systemic disease.                        After obtaining informed consent, the scope was                        passed under direct vision. Throughout the                        procedure, the patient's blood pressure, pulse, and                        oxygen saturations were monitored continuously. The                        Duodenoscope was introduced through the mouth, and                        advanced to the duodenum and used to cannulate the                        bile duct. The ERCP was accomplished without                        difficulty. The patient tolerated the procedure                        well.  Moderate Sedation:      GA Findings:      A biliary stent was visible on the  film. One biliary stent      originating in the cystic duct was emerging from the major papilla.      The stent was visibly occluded. One stent was removed from the      biliary tree using a snare. The bile duct was deeply cannulated with      the 8.5 mm balloon and guidewire. Contrast was injected. I personally      interpreted the bile duct images. There was brisk flow of contrast      through the ducts. Image quality was excellent. Contrast extended to      the main bile duct. Contrast extended to the cystic duct. Contrast      extended to the bifurcation. Contrast extended to the hepatic ducts.        The intra-hepatic and extra-hepatic biliary duct system was normal.      To discover objects, the biliary tree was swept with an 11.5 mm      balloon starting at the bifurcation. Nothing was found. Impression:            - One visibly occluded stent from the cystic duct                        was seen in the major papilla.                        - The cholangiogram was normal.                        - One stent was removed from the biliary tree.                        - The biliary tree was swept and nothing was found. Estimated Blood Loss:  Estimated blood loss: none. Recommendation:        - Patient has a contact number available for                        emergencies. The signs and symptoms of potential                        delayed complications were discussed with the                        patient. Return to normal activities tomorrow.                        Written discharge instructions were provided to the                        patient.                        - Resume previous diet.                        - Continue present medications. Procedure Code(s):     --- Professional ---                        90562                        11536 Diagnosis Code(s):     --- Professional ---                        T85.590A                         Z46.59                        K83.8 CPT copyright 2021 American Medical Association. All rights reserved. Attending Participation:      I personally performed the entire procedure. Scope In: 9:08:03 AM Scope Out: 9:28:48 AM MD Ariel Kendrick MD 2/17/2025 9:58:59 AM This report has been signed electronically by Ariel Villarreal MD Number of Addenda: 0 Note Initiated On: 2/17/2025 8:52 AM      Assessment/Plan     49-year-old woman with complex surgical history and complications as noted above.  She has remnant gallbladder with large stone in place.  I recommended remnant cholecystectomy.  We discussed that depending on the intraoperative findings, she may require biliary resection with reconstruction.  This would be performed through her prior laparotomy incision and I would also try to excise the fistula tract.  Procedure as well as potential risk complications were discussed in detail.  Her  was on the phone for much of the conversation but he was at work and needed to leave the call by the end.  Patient notes that she would like to talk to him regarding surgery and will decide when she would like to schedule a date.  She will also determine whether to have surgery with the team is managing her at the [facility] or with us, I suspect that this was more for a second opinion and she has her established team at the [facility].  Venkata Rodriguez MD

## 2025-03-13 ENCOUNTER — OFFICE VISIT (OUTPATIENT)
Dept: SURGICAL ONCOLOGY | Facility: CLINIC | Age: 50
End: 2025-03-13
Payer: COMMERCIAL

## 2025-03-13 VITALS
SYSTOLIC BLOOD PRESSURE: 146 MMHG | DIASTOLIC BLOOD PRESSURE: 81 MMHG | HEART RATE: 92 BPM | OXYGEN SATURATION: 97 % | RESPIRATION RATE: 16 BRPM | TEMPERATURE: 97 F | WEIGHT: 273.81 LBS

## 2025-03-13 DIAGNOSIS — K80.10 CHRONIC CHOLECYSTITIS WITH CALCULUS: Primary | ICD-10-CM

## 2025-03-13 PROCEDURE — 99205 OFFICE O/P NEW HI 60 MIN: CPT | Performed by: SURGERY

## 2025-03-13 PROCEDURE — 99215 OFFICE O/P EST HI 40 MIN: CPT | Performed by: SURGERY

## 2025-03-13 RX ORDER — ONDANSETRON 4 MG/1
4 TABLET, FILM COATED ORAL EVERY 6 HOURS PRN
COMMUNITY
Start: 2024-01-15

## 2025-03-13 RX ORDER — METFORMIN HYDROCHLORIDE 1000 MG/1
1000 TABLET ORAL
COMMUNITY
Start: 2023-07-23

## 2025-03-13 RX ORDER — LISINOPRIL 10 MG/1
10 TABLET ORAL
COMMUNITY

## 2025-03-13 RX ORDER — CETIRIZINE HYDROCHLORIDE 10 MG/1
10 TABLET ORAL DAILY
COMMUNITY
Start: 2023-11-07

## 2025-03-13 RX ORDER — FLUTICASONE PROPIONATE 50 MCG
2 SPRAY, SUSPENSION (ML) NASAL 2 TIMES DAILY
COMMUNITY
Start: 2023-04-20

## 2025-03-13 RX ORDER — CALC/MAG/B COMPLEX/D3/HERB 61
1 TABLET ORAL
COMMUNITY

## 2025-03-13 RX ORDER — ATORVASTATIN CALCIUM 20 MG/1
20 TABLET, FILM COATED ORAL
COMMUNITY
Start: 2023-07-23

## 2025-03-13 RX ORDER — ACETAMINOPHEN 500 MG
1 TABLET ORAL
COMMUNITY
Start: 2023-07-23

## 2025-03-13 RX ORDER — PHENTERMINE HYDROCHLORIDE 37.5 MG/1
37.5 TABLET ORAL
COMMUNITY
Start: 2025-02-03

## 2025-03-13 RX ORDER — EPINEPHRINE 0.3 MG/.3ML
1 INJECTION SUBCUTANEOUS ONCE AS NEEDED
COMMUNITY

## 2025-03-13 RX ORDER — SERTRALINE HYDROCHLORIDE 50 MG/1
50 TABLET, FILM COATED ORAL DAILY
COMMUNITY
Start: 2023-07-23

## 2025-03-13 RX ORDER — ASCORBIC ACID 250 MG
1 TABLET,CHEWABLE ORAL
COMMUNITY

## 2025-03-13 RX ORDER — TIRZEPATIDE 10 MG/.5ML
INJECTION, SOLUTION SUBCUTANEOUS
COMMUNITY

## 2025-03-13 ASSESSMENT — PAIN SCALES - GENERAL: PAINLEVEL_OUTOF10: 0-NO PAIN

## 2025-03-14 ASSESSMENT — ENCOUNTER SYMPTOMS
NAUSEA: 0
SHORTNESS OF BREATH: 0
SPEECH DIFFICULTY: 0
CONFUSION: 0
ENDOCRINE NEGATIVE: 1
CONSTITUTIONAL NEGATIVE: 1
DYSURIA: 0
HALLUCINATIONS: 0
ABDOMINAL PAIN: 0
WEAKNESS: 0
FLANK PAIN: 0
ADENOPATHY: 0
EYE DISCHARGE: 0
HEADACHES: 0
SORE THROAT: 0
VOMITING: 0

## 2025-05-13 ENCOUNTER — HOSPITAL ENCOUNTER (OUTPATIENT)
Dept: HOSPITAL 101 - LAB | Age: 50
Discharge: HOME | End: 2025-05-13
Payer: COMMERCIAL

## 2025-05-13 DIAGNOSIS — Z01.419: Primary | ICD-10-CM

## 2025-05-13 PROCEDURE — 87624 HPV HI-RISK TYP POOLED RSLT: CPT

## 2025-05-13 PROCEDURE — 88175 CYTOPATH C/V AUTO FLUID REDO: CPT

## (undated) DEVICE — GOWN,AURORA,NONREINFORCED,LARGE: Brand: MEDLINE

## (undated) DEVICE — SHEET BD STD REPOS LO FICT BTM SFT TOP NO STRP 9 HNDL PER

## (undated) DEVICE — NEEDLE INSUF L150MM DIA2MM DISP FOR PNEUMOPERI ENDOPATH

## (undated) DEVICE — WARMER SCP 2 ANTIFOG LAP DISP

## (undated) DEVICE — TOWEL,OR,DSP,ST,BLUE,STD,4/PK,20PK/CS: Brand: MEDLINE

## (undated) DEVICE — SUTURE VCRL SZ 0 L36IN ABSRB UD L36MM CT-1 1/2 CIR J946H

## (undated) DEVICE — BLADE,CARBON-STEEL,11,STRL,DISPOSABLE,TB: Brand: MEDLINE

## (undated) DEVICE — ELECTRODE PT RET AD L9FT HI MOIST COND ADH HYDRGEL CORDED

## (undated) DEVICE — TUBING, SUCTION, 9/32" X 12', STRAIGHT: Brand: MEDLINE INDUSTRIES, INC.

## (undated) DEVICE — COVER,TABLE,44X90,STERILE: Brand: MEDLINE

## (undated) DEVICE — GLOVE ORANGE PI 7 1/2   MSG9075

## (undated) DEVICE — NEPTUNE E-SEP SMOKE EVACUATION PENCIL, COATED, 70MM BLADE, PUSH BUTTON SWITCH: Brand: NEPTUNE E-SEP

## (undated) DEVICE — APPLICATOR MEDICATED 26 CC SOLUTION HI LT ORNG CHLORAPREP

## (undated) DEVICE — SYRINGE MED 10ML LUERLOCK TIP W/O SFTY DISP

## (undated) DEVICE — COUNTER NDL 40 COUNT HLD 70 FOAM BLK ADH W/ MAG

## (undated) DEVICE — Device

## (undated) DEVICE — 4-PORT MANIFOLD: Brand: NEPTUNE 2

## (undated) DEVICE — GLOVE ORANGE PI 8   MSG9080

## (undated) DEVICE — PACK,BASIC: Brand: MEDLINE

## (undated) DEVICE — KIT,ANTI FOG,W/SPONGE & FLUID,SOFT PACK: Brand: MEDLINE

## (undated) DEVICE — GOWN,AURORA,NONRNF,XL,30/CS: Brand: MEDLINE

## (undated) DEVICE — TUBING INSUFFLATION SMK EVAC HI FLO SET PNEUMOCLEAR

## (undated) DEVICE — YANKAUER,SMOOTH HANDLE,HIGH CAPACITY: Brand: MEDLINE INDUSTRIES, INC.

## (undated) DEVICE — TROCAR: Brand: KII SLEEVE

## (undated) DEVICE — LINER INCONT W7XL14IN PEACH POLYMER FLUF ADH WT CNTOUR DLX

## (undated) DEVICE — COVER LT HNDL BLU PLAS

## (undated) DEVICE — PAD BD FOAM 33X72X2.75 IN BLU EGGCRATE

## (undated) DEVICE — LABEL MED MINI W/ MARKER

## (undated) DEVICE — SHEARS ENDOSCP HARM 36CM ULTRASONIC CRV TIP UPGRD

## (undated) DEVICE — DRAPE, LAVH, STERILE: Brand: MEDLINE

## (undated) DEVICE — SEALER ENDOSCP NANO COAT OPN DIV CRV L JAW LIGASURE IMPACT

## (undated) DEVICE — POUCH, INSTRUMENT, 3POCKET, INVISISHIELD: Brand: MEDLINE

## (undated) DEVICE — SKIN PREP TRAY 4 COMPARTM TRAY: Brand: MEDLINE INDUSTRIES, INC.

## (undated) DEVICE — TOTAL TRAY, DB, 100% SILI FOLEY, 16FR 10: Brand: MEDLINE

## (undated) DEVICE — TROCAR: Brand: KII FIOS FIRST ENTRY

## (undated) DEVICE — GAUZE,SPONGE,4"X4",16PLY,XRAY,STRL,LF: Brand: MEDLINE

## (undated) DEVICE — AGENT HEMSTAT 3GM OXIDIZED REGENERATED CELOS ABSRB FOR CONT (ORDER MULTIPLES OF 5EA)

## (undated) DEVICE — ADHESIVE SKIN CLSR 0.7ML TOP DERMBND ADV

## (undated) DEVICE — SURGICEL ENDOSCP APPL

## (undated) DEVICE — SUTURE VCRL SZ 4-0 L18IN ABSRB UD L19MM PS-2 3/8 CIR PRIM J496H

## (undated) DEVICE — TIP IU L8CM DIA6.7MM BLU SIL FLX DISP RUMI II